# Patient Record
Sex: MALE | Race: WHITE | Employment: OTHER | URBAN - METROPOLITAN AREA
[De-identification: names, ages, dates, MRNs, and addresses within clinical notes are randomized per-mention and may not be internally consistent; named-entity substitution may affect disease eponyms.]

---

## 2018-10-24 ENCOUNTER — HOSPITAL ENCOUNTER (OUTPATIENT)
Dept: INFUSION THERAPY | Age: 73
End: 2018-10-24

## 2018-11-01 ENCOUNTER — HOSPITAL ENCOUNTER (OUTPATIENT)
Dept: INFUSION THERAPY | Age: 73
Discharge: HOME | End: 2018-11-01
Payer: MEDICARE

## 2018-11-01 VITALS
BODY MASS INDEX: 26.92 KG/M2 | SYSTOLIC BLOOD PRESSURE: 150 MMHG | TEMPERATURE: 98.5 F | HEIGHT: 73 IN | OXYGEN SATURATION: 97 % | RESPIRATION RATE: 18 BRPM | WEIGHT: 203.1 LBS | DIASTOLIC BLOOD PRESSURE: 73 MMHG | HEART RATE: 76 BPM

## 2018-11-01 LAB
ALBUMIN SERPL-MCNC: 3.8 G/DL (ref 3.4–5)
ALBUMIN/GLOB SERPL: 1.1 {RATIO} (ref 0.8–1.7)
ALP SERPL-CCNC: 184 U/L (ref 45–117)
ALT SERPL-CCNC: 65 U/L (ref 16–61)
ANION GAP SERPL CALC-SCNC: 13 MMOL/L (ref 3–18)
AST SERPL-CCNC: 63 U/L (ref 15–37)
BASO+EOS+MONOS # BLD AUTO: 0.7 K/UL (ref 0–2.3)
BASO+EOS+MONOS # BLD AUTO: 13 % (ref 0.1–17)
BILIRUB SERPL-MCNC: 0.5 MG/DL (ref 0.2–1)
BUN SERPL-MCNC: 47 MG/DL (ref 7–18)
BUN/CREAT SERPL: 14
CALCIUM SERPL-MCNC: 9 MG/DL (ref 8.5–10.1)
CHLORIDE SERPL-SCNC: 96 MMOL/L (ref 100–108)
CO2 SERPL-SCNC: 22 MMOL/L (ref 21–32)
CREAT SERPL-MCNC: 3.27 MG/DL (ref 0.6–1.3)
DIFFERENTIAL METHOD BLD: ABNORMAL
ERYTHROCYTE [DISTWIDTH] IN BLOOD BY AUTOMATED COUNT: 12.7 % (ref 11.5–14.5)
GLOBULIN SER CALC-MCNC: 3.4 G/DL (ref 2–4)
GLUCOSE SERPL-MCNC: 431 MG/DL (ref 74–99)
HCT VFR BLD AUTO: 26.6 % (ref 36–48)
HGB BLD-MCNC: 9.2 G/DL (ref 12–16)
LDH SERPL L TO P-CCNC: 234 U/L (ref 81–234)
LYMPHOCYTES # BLD: 1.8 K/UL (ref 1.1–5.9)
LYMPHOCYTES NFR BLD: 31 % (ref 14–44)
MCH RBC QN AUTO: 33.3 PG (ref 25–35)
MCHC RBC AUTO-ENTMCNC: 34.6 G/DL (ref 31–37)
MCV RBC AUTO: 96.4 FL (ref 78–102)
NEUTS SEG # BLD: 3.2 K/UL (ref 1.8–9.5)
NEUTS SEG NFR BLD: 56 % (ref 40–70)
PLATELET # BLD AUTO: 165 K/UL (ref 140–440)
POTASSIUM SERPL-SCNC: 4.1 MMOL/L (ref 3.5–5.5)
PROT SERPL-MCNC: 7.2 G/DL (ref 6.4–8.2)
RBC # BLD AUTO: 2.76 M/UL (ref 4.1–5.1)
SODIUM SERPL-SCNC: 131 MMOL/L (ref 136–145)
WBC # BLD AUTO: 5.7 K/UL (ref 4.5–13)

## 2018-11-01 PROCEDURE — 83615 LACTATE (LD) (LDH) ENZYME: CPT | Performed by: INTERNAL MEDICINE

## 2018-11-01 PROCEDURE — 80053 COMPREHEN METABOLIC PANEL: CPT | Performed by: INTERNAL MEDICINE

## 2018-11-01 PROCEDURE — 74011000258 HC RX REV CODE- 258: Performed by: INTERNAL MEDICINE

## 2018-11-01 PROCEDURE — 85025 COMPLETE CBC W/AUTO DIFF WBC: CPT | Performed by: INTERNAL MEDICINE

## 2018-11-01 PROCEDURE — 96375 TX/PRO/DX INJ NEW DRUG ADDON: CPT

## 2018-11-01 PROCEDURE — 96413 CHEMO IV INFUSION 1 HR: CPT

## 2018-11-01 PROCEDURE — 74011250637 HC RX REV CODE- 250/637: Performed by: INTERNAL MEDICINE

## 2018-11-01 PROCEDURE — 96415 CHEMO IV INFUSION ADDL HR: CPT

## 2018-11-01 PROCEDURE — 36415 COLL VENOUS BLD VENIPUNCTURE: CPT | Performed by: INTERNAL MEDICINE

## 2018-11-01 PROCEDURE — 99203 OFFICE O/P NEW LOW 30 MIN: CPT

## 2018-11-01 PROCEDURE — 74011250636 HC RX REV CODE- 250/636: Performed by: INTERNAL MEDICINE

## 2018-11-01 PROCEDURE — 36415 COLL VENOUS BLD VENIPUNCTURE: CPT

## 2018-11-01 RX ORDER — INSULIN GLARGINE 100 [IU]/ML
35 INJECTION, SOLUTION SUBCUTANEOUS
Status: ON HOLD | COMMUNITY
End: 2018-12-06 | Stop reason: SDUPTHER

## 2018-11-01 RX ORDER — GLUCOSAMINE/CHONDR SU A SOD 750-600 MG
TABLET ORAL
COMMUNITY

## 2018-11-01 RX ORDER — TRIAMCINOLONE ACETONIDE 1 MG/G
OINTMENT TOPICAL 2 TIMES DAILY
COMMUNITY

## 2018-11-01 RX ORDER — IRON 18 MG
TABLET ORAL
COMMUNITY

## 2018-11-01 RX ORDER — DIPHENHYDRAMINE HYDROCHLORIDE 50 MG/ML
50 INJECTION, SOLUTION INTRAMUSCULAR; INTRAVENOUS ONCE
Status: COMPLETED | OUTPATIENT
Start: 2018-11-01 | End: 2018-11-01

## 2018-11-01 RX ORDER — DIMETHICONE 13 MG/ML
425 LOTION TOPICAL DAILY
COMMUNITY

## 2018-11-01 RX ORDER — GLUCOSAMINE SULFATE 1500 MG
1000 POWDER IN PACKET (EA) ORAL 2 TIMES DAILY
COMMUNITY

## 2018-11-01 RX ORDER — ACETAMINOPHEN 325 MG/1
650 TABLET ORAL ONCE
Status: COMPLETED | OUTPATIENT
Start: 2018-11-01 | End: 2018-11-01

## 2018-11-01 RX ORDER — INSULIN LISPRO 100 [IU]/ML
INJECTION, SOLUTION INTRAVENOUS; SUBCUTANEOUS AS NEEDED
COMMUNITY

## 2018-11-01 RX ORDER — ACETAMINOPHEN 325 MG/1
650 TABLET ORAL
Status: DISCONTINUED | OUTPATIENT
Start: 2018-11-01 | End: 2018-11-05 | Stop reason: HOSPADM

## 2018-11-01 RX ORDER — ZINC GLUCONATE 50 MG
TABLET ORAL
COMMUNITY

## 2018-11-01 RX ORDER — DIPHENHYDRAMINE HYDROCHLORIDE 50 MG/ML
50 INJECTION, SOLUTION INTRAMUSCULAR; INTRAVENOUS
Status: DISCONTINUED | OUTPATIENT
Start: 2018-11-01 | End: 2018-11-05 | Stop reason: HOSPADM

## 2018-11-01 RX ORDER — SODIUM CHLORIDE 0.9 % (FLUSH) 0.9 %
10-40 SYRINGE (ML) INJECTION AS NEEDED
Status: DISCONTINUED | OUTPATIENT
Start: 2018-11-01 | End: 2018-11-05 | Stop reason: HOSPADM

## 2018-11-01 RX ORDER — FUROSEMIDE 80 MG/1
80 TABLET ORAL 2 TIMES DAILY
COMMUNITY

## 2018-11-01 RX ORDER — SIMVASTATIN 20 MG/1
TABLET, FILM COATED ORAL
COMMUNITY

## 2018-11-01 RX ORDER — SODIUM CHLORIDE 9 MG/ML
50 INJECTION, SOLUTION INTRAVENOUS CONTINUOUS
Status: DISPENSED | OUTPATIENT
Start: 2018-11-01 | End: 2018-11-02

## 2018-11-01 RX ORDER — POTASSIUM CHLORIDE 20 MEQ/1
20 TABLET, EXTENDED RELEASE ORAL 2 TIMES DAILY
COMMUNITY

## 2018-11-01 RX ORDER — CARVEDILOL 12.5 MG/1
12.5 TABLET ORAL DAILY
COMMUNITY

## 2018-11-01 RX ADMIN — RITUXIMAB 800 MG: 10 INJECTION, SOLUTION INTRAVENOUS at 12:02

## 2018-11-01 RX ADMIN — METHYLPREDNISOLONE SODIUM SUCCINATE 125 MG: 125 INJECTION, POWDER, FOR SOLUTION INTRAMUSCULAR; INTRAVENOUS at 11:20

## 2018-11-01 RX ADMIN — Medication 10 ML: at 15:50

## 2018-11-01 RX ADMIN — ACETAMINOPHEN 650 MG: 325 TABLET ORAL at 11:18

## 2018-11-01 RX ADMIN — SODIUM CHLORIDE 50 ML/HR: 900 INJECTION, SOLUTION INTRAVENOUS at 11:10

## 2018-11-01 RX ADMIN — Medication 10 ML: at 11:10

## 2018-11-01 RX ADMIN — DIPHENHYDRAMINE HYDROCHLORIDE 50 MG: 50 INJECTION INTRAMUSCULAR; INTRAVENOUS at 11:30

## 2018-11-01 NOTE — PROGRESS NOTES
[facility] Progress Note Date: 2018 Name: Marisol Higgins MRN: [id number] : 1945 Chemotherapy Cycle: C1 of 2 Rituximab Mr. Dc Burgess was assessed and education was provided. Dr. Santo Bhandari was here to obtained signed consent from patient for Ritux infusion. Consent signed. Care note reviewed with patient and his friend, Ana Barahona. Understanding was verbalized by both of them. Mr. Gómez Louis vitals were reviewed. Visit Vitals /73 (BP 1 Location: Right arm, BP Patient Position: At rest) Pulse 76 Temp 98.5 °F (36.9 °C) Resp 18 Ht 6' 1\" (1.854 m) Wt 92.1 kg (203 lb 1.6 oz) SpO2 97% BMI 26.80 kg/m² Patient Vitals for the past 12 hrs: 
 Temp Pulse Resp BP SpO2  
18 1546 98.5 °F (36.9 °C) 76 18 150/73   
18 1435  70 18 147/66   
18 1405  69 18 135/71   
18 1335  67 18 146/73   
18 1305  66 18 140/69   
18 1235  88 18 119/56   
18 1045 97.6 °F (36.4 °C) 66 18 142/66 97 % IV started in left forearm w/22 gauge INT w/o difficulty. Good blood return obtained. Labs drawn, followed with 10 ml NS flush. Lab results were obtained and reviewed. Recent Results (from the past 12 hour(s)) CBC WITH 3 PART DIFF Collection Time: 18 11:10 AM  
Result Value Ref Range WBC 5.7 4.5 - 13.0 K/uL  
 RBC 2.76 (L) 4.10 - 5.10 M/uL HGB 9.2 (L) 12.0 - 16.0 g/dL HCT 26.6 (L) 36 - 48 % MCV 96.4 78 - 102 FL  
 MCH 33.3 25.0 - 35.0 PG  
 MCHC 34.6 31 - 37 g/dL  
 RDW 12.7 11.5 - 14.5 % PLATELET 244 383 - 970 K/uL NEUTROPHILS 56 40 - 70 % MIXED CELLS 13 0.1 - 17 % LYMPHOCYTES 31 14 - 44 % ABS. NEUTROPHILS 3.2 1.8 - 9.5 K/UL  
 ABS. MIXED CELLS 0.7 0.0 - 2.3 K/uL  
 ABS. LYMPHOCYTES 1.8 1.1 - 5.9 K/UL  
 DF AUTOMATED METABOLIC PANEL, COMPREHENSIVE Collection Time: 18 11:10 AM  
Result Value Ref Range Sodium 131 (L) 136 - 145 mmol/L  Potassium 4.1 3.5 - 5.5 mmol/L  
 Chloride 96 (L) 100 - 108 mmol/L  
 CO2 22 21 - 32 mmol/L Anion gap 13 3.0 - 18 mmol/L Glucose 431 (HH) 74 - 99 mg/dL BUN 47 (H) 7.0 - 18 MG/DL Creatinine 3.27 (H) 0.6 - 1.3 MG/DL  
 BUN/Creatinine ratio 14 GFR est AA 23 (L) >60 ml/min/1.73m2 GFR est non-AA 19 (L) >60 ml/min/1.73m2 Calcium 9.0 8.5 - 10.1 MG/DL Bilirubin, total 0.5 0.2 - 1.0 MG/DL  
 ALT (SGPT) 65 (H) 16 - 61 U/L  
 AST (SGOT) 63 (H) 15 - 37 U/L Alk. phosphatase 184 (H) 45 - 117 U/L Protein, total 7.2 6.4 - 8.2 g/dL Albumin 3.8 3.4 - 5.0 g/dL Globulin 3.4 2.0 - 4.0 g/dL A-G Ratio 1.1 0.8 - 1.7 LD Collection Time: 11/01/18 11:10 AM  
Result Value Ref Range  81 - 234 U/L Pre-medications of oral Tylenol 650 mg, IVP Benadryl 50 mg/10 ml, IVP Solu Medrol 125 mg/2 ml were administered as ordered and chemotherapy was initiated. Rituxan 800 mg/200ml was infused at 25 ml/hr x 30 minutes, 50 ml/hr x 30 minutes, 75 ml/hr x 30 minutes, 100 ml/hr x 30 minutes, 125 ml/hr x 30 minutes, 150 ml/hr until infusion completed. Rituxan was 4 mg /ml and infusion was started at 50 mg/hr and increased by 50 mg/hr at each 30 minute interval.  Patient was observed for 60 minutes after infusion completed, no s/s reaction were noted. IV flushed with 10 ml NS and removed. No irritation or drainage noted at site, gauze and Coban applied. Mr. Jose Ramachandran tolerated infusion, and had no complaints at this time. Armband removed and shredded. Mr. Jose Ramachandran was discharged from [place] [address] in stable condition at 1600. He is to return on 11/15/2018 at 1000 for his next appointment for second Rituxan infusion. Nava Pimentel RN November 1, 2018 
4:44 PM

## 2018-11-08 RX ORDER — ACETAMINOPHEN 325 MG/1
650 TABLET ORAL ONCE
Status: CANCELLED | OUTPATIENT
Start: 2018-11-15 | End: 2018-11-15

## 2018-11-08 RX ORDER — DIPHENHYDRAMINE HYDROCHLORIDE 50 MG/ML
50 INJECTION, SOLUTION INTRAMUSCULAR; INTRAVENOUS
Status: CANCELLED | OUTPATIENT
Start: 2018-11-15

## 2018-11-08 RX ORDER — DIPHENHYDRAMINE HYDROCHLORIDE 50 MG/ML
50 INJECTION, SOLUTION INTRAMUSCULAR; INTRAVENOUS ONCE
Status: CANCELLED | OUTPATIENT
Start: 2018-11-15 | End: 2018-11-15

## 2018-11-08 RX ORDER — ACETAMINOPHEN 325 MG/1
650 TABLET ORAL
Status: CANCELLED | OUTPATIENT
Start: 2018-11-15

## 2018-11-15 ENCOUNTER — HOSPITAL ENCOUNTER (OUTPATIENT)
Dept: INFUSION THERAPY | Age: 73
Discharge: HOME | End: 2018-11-15
Payer: MEDICARE

## 2018-11-15 VITALS
TEMPERATURE: 97.9 F | BODY MASS INDEX: 26.53 KG/M2 | DIASTOLIC BLOOD PRESSURE: 71 MMHG | RESPIRATION RATE: 18 BRPM | HEIGHT: 73 IN | SYSTOLIC BLOOD PRESSURE: 144 MMHG | HEART RATE: 77 BPM | WEIGHT: 200.2 LBS

## 2018-11-15 LAB
ALBUMIN SERPL-MCNC: 3.7 G/DL (ref 3.4–5)
ALBUMIN/GLOB SERPL: 1.1 {RATIO} (ref 0.8–1.7)
ALP SERPL-CCNC: 171 U/L (ref 45–117)
ALT SERPL-CCNC: 55 U/L (ref 16–61)
ANION GAP SERPL CALC-SCNC: 13 MMOL/L (ref 3–18)
AST SERPL-CCNC: 45 U/L (ref 15–37)
BASO+EOS+MONOS # BLD AUTO: 0.4 K/UL (ref 0–2.3)
BASO+EOS+MONOS # BLD AUTO: 6 % (ref 0.1–17)
BILIRUB SERPL-MCNC: 0.5 MG/DL (ref 0.2–1)
BUN SERPL-MCNC: 41 MG/DL (ref 7–18)
BUN/CREAT SERPL: 13
CALCIUM SERPL-MCNC: 9 MG/DL (ref 8.5–10.1)
CHLORIDE SERPL-SCNC: 94 MMOL/L (ref 100–108)
CO2 SERPL-SCNC: 26 MMOL/L (ref 21–32)
CREAT SERPL-MCNC: 3.16 MG/DL (ref 0.6–1.3)
DIFFERENTIAL METHOD BLD: ABNORMAL
ERYTHROCYTE [DISTWIDTH] IN BLOOD BY AUTOMATED COUNT: 12.5 % (ref 11.5–14.5)
GLOBULIN SER CALC-MCNC: 3.4 G/DL (ref 2–4)
GLUCOSE SERPL-MCNC: 420 MG/DL (ref 74–99)
HCT VFR BLD AUTO: 30.5 % (ref 36–48)
HGB BLD-MCNC: 10.7 G/DL (ref 12–16)
LDH SERPL L TO P-CCNC: 214 U/L (ref 81–234)
LYMPHOCYTES # BLD: 1.6 K/UL (ref 1.1–5.9)
LYMPHOCYTES NFR BLD: 25 % (ref 14–44)
MCH RBC QN AUTO: 33.4 PG (ref 25–35)
MCHC RBC AUTO-ENTMCNC: 35.1 G/DL (ref 31–37)
MCV RBC AUTO: 95.3 FL (ref 78–102)
NEUTS SEG # BLD: 4.3 K/UL (ref 1.8–9.5)
NEUTS SEG NFR BLD: 69 % (ref 40–70)
PLATELET # BLD AUTO: 173 K/UL (ref 140–440)
POTASSIUM SERPL-SCNC: 3.7 MMOL/L (ref 3.5–5.5)
PROT SERPL-MCNC: 7.1 G/DL (ref 6.4–8.2)
RBC # BLD AUTO: 3.2 M/UL (ref 4.1–5.1)
SODIUM SERPL-SCNC: 133 MMOL/L (ref 136–145)
WBC # BLD AUTO: 6.3 K/UL (ref 4.5–13)

## 2018-11-15 PROCEDURE — 36415 COLL VENOUS BLD VENIPUNCTURE: CPT

## 2018-11-15 PROCEDURE — 85025 COMPLETE CBC W/AUTO DIFF WBC: CPT

## 2018-11-15 PROCEDURE — 80053 COMPREHEN METABOLIC PANEL: CPT

## 2018-11-15 PROCEDURE — 83615 LACTATE (LD) (LDH) ENZYME: CPT

## 2018-11-15 RX ORDER — SODIUM CHLORIDE 0.9 % (FLUSH) 0.9 %
5-10 SYRINGE (ML) INJECTION AS NEEDED
Status: DISCONTINUED | OUTPATIENT
Start: 2018-11-15 | End: 2018-11-19 | Stop reason: HOSPADM

## 2018-11-15 NOTE — PROGRESS NOTES
[facility] Progress Note Date: November 15, 2018 Name: Grzegorz Mendosa MRN: [id number] : 1945 Chemotherapy Cycle: C2 of 2 Rituxan  (HELD Today) Mr. Guillermo Jeronimo was assessed and education was provided. Mr. Guillermo Jeronimo arrived ambulatory with family friend at his side. Blood was drawn after initiating #22 gauge needle in patient's LFA x1 attempt. Pt tolerated well. Mr. Guillermo Jeronimo stated that after last infusion his blood glucose was elevated above 600 and he went to the emergency room and was treated for hyperglycemia. Mr. Guillermo Jeronimo stated he did not notify Dr. Ramez Maldonado but stated he did notify his physician, Dr. Deloris Sicard who manages his diabetes. Mr. Guillermo Jeronimo stated his insulin sliding scale was adjusted at that time. Mr. Guillermo Jeronimo stated he did not want to receive steroid today as premedication due to concerns of repeat episode of hyperglycemia. Critical glucose level today of 420. Dr. Ramez Maldonado called and notified of patient's current blood glucose level and also notified of patient's concerns about not wanting to receive Steroid today. Per Dr. Claudene Mallet hold patient's treatment today due to elevated blood sugar and patient's concerns. Pt to follow up as scheduled with Dr. Ramez Maldonado as scheduled to discuss plan for managing blood glucose levels prior to next treatment. Mr. Guillermo Jeronimo stated he would call Dr. Deloris Sicard as well about current glucose level and about plan to manage glucose levels which he stated have been higher than usual since initial dose of steroid. Mr. Cintia Hernandez vitals were reviewed. Visit Vitals /71 (BP 1 Location: Right arm, BP Patient Position: Sitting) Pulse 77 Temp 97.9 °F (36.6 °C) Resp 18 Ht 6' 1\" (1.854 m) Wt 90.8 kg (200 lb 3.2 oz) BMI 26.41 kg/m² Lab results were obtained and reviewed. Recent Results (from the past 12 hour(s)) CBC WITH 3 PART DIFF Collection Time: 11/15/18 10:30 AM  
Result Value Ref Range WBC 6.3 4.5 - 13.0 K/uL RBC 3.20 (L) 4.10 - 5.10 M/uL  
 HGB 10.7 (L) 12.0 - 16.0 g/dL HCT 30.5 (L) 36 - 48 % MCV 95.3 78 - 102 FL  
 MCH 33.4 25.0 - 35.0 PG  
 MCHC 35.1 31 - 37 g/dL  
 RDW 12.5 11.5 - 14.5 % PLATELET 545 949 - 329 K/uL NEUTROPHILS 69 40 - 70 % MIXED CELLS 6 0.1 - 17 % LYMPHOCYTES 25 14 - 44 % ABS. NEUTROPHILS 4.3 1.8 - 9.5 K/UL  
 ABS. MIXED CELLS 0.4 0.0 - 2.3 K/uL  
 ABS. LYMPHOCYTES 1.6 1.1 - 5.9 K/UL  
 DF AUTOMATED    
LD Collection Time: 11/15/18 10:30 AM  
Result Value Ref Range  81 - 439 U/L  
METABOLIC PANEL, COMPREHENSIVE Collection Time: 11/15/18 10:30 AM  
Result Value Ref Range Sodium 133 (L) 136 - 145 mmol/L Potassium 3.7 3.5 - 5.5 mmol/L Chloride 94 (L) 100 - 108 mmol/L  
 CO2 26 21 - 32 mmol/L Anion gap 13 3.0 - 18 mmol/L Glucose 420 (HH) 74 - 99 mg/dL BUN 41 (H) 7.0 - 18 MG/DL Creatinine 3.16 (H) 0.6 - 1.3 MG/DL  
 BUN/Creatinine ratio 13 GFR est AA 23 (L) >60 ml/min/1.73m2 GFR est non-AA 19 (L) >60 ml/min/1.73m2 Calcium 9.0 8.5 - 10.1 MG/DL Bilirubin, total 0.5 0.2 - 1.0 MG/DL  
 ALT (SGPT) 55 16 - 61 U/L  
 AST (SGOT) 45 (H) 15 - 37 U/L Alk. phosphatase 171 (H) 45 - 117 U/L Protein, total 7.1 6.4 - 8.2 g/dL Albumin 3.7 3.4 - 5.0 g/dL Globulin 3.4 2.0 - 4.0 g/dL A-G Ratio 1.1 0.8 - 1.7 PIV d'c'd intact. Site secured with gauze and coban. Mr. Sandhya Hall was discharged from [place] [address] in stable condition at 1230. He is to follow up with Dr. Romayne Ingle as scheduled (11/26/18 per patient) and date of next treatment to be determined at that time. Armband removed and shredded. Chaya Davila RN November 15, 2018 1:35 PM

## 2018-12-05 ENCOUNTER — HOSPITAL ENCOUNTER (OUTPATIENT)
Age: 73
Setting detail: OBSERVATION
LOS: 1 days | Discharge: HOME | End: 2018-12-06
Attending: HOSPITALIST | Admitting: HOSPITALIST
Payer: MEDICARE

## 2018-12-05 ENCOUNTER — HOSPITAL ENCOUNTER (OUTPATIENT)
Dept: INFUSION THERAPY | Age: 73
Discharge: HOME | End: 2018-12-05
Payer: MEDICARE

## 2018-12-05 VITALS
HEART RATE: 90 BPM | HEIGHT: 73 IN | DIASTOLIC BLOOD PRESSURE: 67 MMHG | RESPIRATION RATE: 18 BRPM | BODY MASS INDEX: 26.59 KG/M2 | OXYGEN SATURATION: 99 % | TEMPERATURE: 98.8 F | WEIGHT: 200.6 LBS | SYSTOLIC BLOOD PRESSURE: 130 MMHG

## 2018-12-05 PROBLEM — E11.9 DIABETES MELLITUS, TYPE II, INSULIN DEPENDENT (HCC): Status: ACTIVE | Noted: 2018-12-05

## 2018-12-05 PROBLEM — E78.5 HYPERLIPIDEMIA: Status: ACTIVE | Noted: 2018-12-05

## 2018-12-05 PROBLEM — N01.9 PAUCI-IMMUNE RPGN (RAPIDLY PROGRESSIVE GLOMERULONEPHRITIS): Status: ACTIVE | Noted: 2018-12-05

## 2018-12-05 PROBLEM — R73.9 HYPERGLYCEMIA: Status: ACTIVE | Noted: 2018-12-05

## 2018-12-05 PROBLEM — I10 ESSENTIAL HYPERTENSION: Status: ACTIVE | Noted: 2018-12-05

## 2018-12-05 PROBLEM — Z79.4 DIABETES MELLITUS, TYPE II, INSULIN DEPENDENT (HCC): Status: ACTIVE | Noted: 2018-12-05

## 2018-12-05 LAB
ALBUMIN SERPL-MCNC: 3 G/DL (ref 3.4–5)
ALBUMIN SERPL-MCNC: 3 G/DL (ref 3.4–5)
ALBUMIN/GLOB SERPL: 0.9 {RATIO} (ref 0.8–1.7)
ALBUMIN/GLOB SERPL: 0.9 {RATIO} (ref 0.8–1.7)
ALP SERPL-CCNC: 222 U/L (ref 45–117)
ALP SERPL-CCNC: 251 U/L (ref 45–117)
ALT SERPL-CCNC: 27 U/L (ref 16–61)
ALT SERPL-CCNC: 35 U/L (ref 16–61)
ANION GAP SERPL CALC-SCNC: 7 MMOL/L (ref 3–18)
ANION GAP SERPL CALC-SCNC: 9 MMOL/L (ref 3–18)
AST SERPL-CCNC: 23 U/L (ref 15–37)
AST SERPL-CCNC: 37 U/L (ref 15–37)
BASO+EOS+MONOS # BLD AUTO: 1.3 K/UL (ref 0–2.3)
BASO+EOS+MONOS # BLD AUTO: 10 % (ref 0.1–17)
BILIRUB SERPL-MCNC: 0.4 MG/DL (ref 0.2–1)
BILIRUB SERPL-MCNC: 0.5 MG/DL (ref 0.2–1)
BUN SERPL-MCNC: 54 MG/DL (ref 7–18)
BUN SERPL-MCNC: 57 MG/DL (ref 7–18)
BUN/CREAT SERPL: 17
BUN/CREAT SERPL: 17
CALCIUM SERPL-MCNC: 8.7 MG/DL (ref 8.5–10.1)
CALCIUM SERPL-MCNC: 8.8 MG/DL (ref 8.5–10.1)
CHLORIDE SERPL-SCNC: 100 MMOL/L (ref 100–108)
CHLORIDE SERPL-SCNC: 102 MMOL/L (ref 100–108)
CO2 SERPL-SCNC: 25 MMOL/L (ref 21–32)
CO2 SERPL-SCNC: 30 MMOL/L (ref 21–32)
CREAT SERPL-MCNC: 3.2 MG/DL (ref 0.6–1.3)
CREAT SERPL-MCNC: 3.35 MG/DL (ref 0.6–1.3)
DIFFERENTIAL METHOD BLD: ABNORMAL
ERYTHROCYTE [DISTWIDTH] IN BLOOD BY AUTOMATED COUNT: 12.1 % (ref 11.5–14.5)
EST. AVERAGE GLUCOSE BLD GHB EST-MCNC: 258 MG/DL
GLOBULIN SER CALC-MCNC: 3.5 G/DL (ref 2–4)
GLOBULIN SER CALC-MCNC: 3.5 G/DL (ref 2–4)
GLUCOSE BLD STRIP.AUTO-MCNC: 305 MG/DL (ref 70–110)
GLUCOSE BLD STRIP.AUTO-MCNC: 374 MG/DL (ref 70–110)
GLUCOSE SERPL-MCNC: 228 MG/DL (ref 74–99)
GLUCOSE SERPL-MCNC: 365 MG/DL (ref 74–99)
HBA1C MFR BLD: 10.6 % (ref 4.2–5.6)
HCT VFR BLD AUTO: 27.2 % (ref 36–48)
HGB BLD-MCNC: 9.4 G/DL (ref 12–16)
LDH SERPL L TO P-CCNC: 156 U/L (ref 81–234)
LYMPHOCYTES # BLD: 1.4 K/UL (ref 1.1–5.9)
LYMPHOCYTES NFR BLD: 11 % (ref 14–44)
MCH RBC QN AUTO: 33 PG (ref 25–35)
MCHC RBC AUTO-ENTMCNC: 34.6 G/DL (ref 31–37)
MCV RBC AUTO: 95.4 FL (ref 78–102)
NEUTS SEG # BLD: 10 K/UL (ref 1.8–9.5)
NEUTS SEG NFR BLD: 79 % (ref 40–70)
PLATELET # BLD AUTO: 396 K/UL (ref 140–440)
POTASSIUM SERPL-SCNC: 4 MMOL/L (ref 3.5–5.5)
POTASSIUM SERPL-SCNC: 4.8 MMOL/L (ref 3.5–5.5)
PROT SERPL-MCNC: 6.5 G/DL (ref 6.4–8.2)
PROT SERPL-MCNC: 6.5 G/DL (ref 6.4–8.2)
RBC # BLD AUTO: 2.85 M/UL (ref 4.1–5.1)
SODIUM SERPL-SCNC: 136 MMOL/L (ref 136–145)
SODIUM SERPL-SCNC: 137 MMOL/L (ref 136–145)
WBC # BLD AUTO: 12.7 K/UL (ref 4.5–13)

## 2018-12-05 PROCEDURE — 74011250637 HC RX REV CODE- 250/637: Performed by: INTERNAL MEDICINE

## 2018-12-05 PROCEDURE — 74011250637 HC RX REV CODE- 250/637: Performed by: PHYSICIAN ASSISTANT

## 2018-12-05 PROCEDURE — 36415 COLL VENOUS BLD VENIPUNCTURE: CPT

## 2018-12-05 PROCEDURE — 82962 GLUCOSE BLOOD TEST: CPT

## 2018-12-05 PROCEDURE — 96415 CHEMO IV INFUSION ADDL HR: CPT

## 2018-12-05 PROCEDURE — 74011250636 HC RX REV CODE- 250/636: Performed by: PHYSICIAN ASSISTANT

## 2018-12-05 PROCEDURE — 74011636637 HC RX REV CODE- 636/637: Performed by: PHYSICIAN ASSISTANT

## 2018-12-05 PROCEDURE — 74011250636 HC RX REV CODE- 250/636: Performed by: INTERNAL MEDICINE

## 2018-12-05 PROCEDURE — 83615 LACTATE (LD) (LDH) ENZYME: CPT

## 2018-12-05 PROCEDURE — 96413 CHEMO IV INFUSION 1 HR: CPT

## 2018-12-05 PROCEDURE — 80053 COMPREHEN METABOLIC PANEL: CPT

## 2018-12-05 PROCEDURE — 96360 HYDRATION IV INFUSION INIT: CPT

## 2018-12-05 PROCEDURE — 85025 COMPLETE CBC W/AUTO DIFF WBC: CPT

## 2018-12-05 PROCEDURE — 74011636637 HC RX REV CODE- 636/637: Performed by: HOSPITALIST

## 2018-12-05 PROCEDURE — 83036 HEMOGLOBIN GLYCOSYLATED A1C: CPT

## 2018-12-05 PROCEDURE — 74011000258 HC RX REV CODE- 258: Performed by: INTERNAL MEDICINE

## 2018-12-05 PROCEDURE — 99218 HC RM OBSERVATION: CPT

## 2018-12-05 PROCEDURE — 96375 TX/PRO/DX INJ NEW DRUG ADDON: CPT

## 2018-12-05 PROCEDURE — 96372 THER/PROPH/DIAG INJ SC/IM: CPT

## 2018-12-05 PROCEDURE — 96361 HYDRATE IV INFUSION ADD-ON: CPT

## 2018-12-05 RX ORDER — CARVEDILOL 12.5 MG/1
12.5 TABLET ORAL DAILY
Status: DISCONTINUED | OUTPATIENT
Start: 2018-12-06 | End: 2018-12-06 | Stop reason: HOSPADM

## 2018-12-05 RX ORDER — ACETAMINOPHEN 325 MG/1
650 TABLET ORAL
Status: DISCONTINUED | OUTPATIENT
Start: 2018-12-05 | End: 2018-12-06 | Stop reason: HOSPADM

## 2018-12-05 RX ORDER — ACETAMINOPHEN 325 MG/1
650 TABLET ORAL
Status: DISCONTINUED | OUTPATIENT
Start: 2018-12-05 | End: 2018-12-09 | Stop reason: HOSPADM

## 2018-12-05 RX ORDER — SODIUM CHLORIDE 0.9 % (FLUSH) 0.9 %
5-10 SYRINGE (ML) INJECTION EVERY 8 HOURS
Status: DISCONTINUED | OUTPATIENT
Start: 2018-12-05 | End: 2018-12-06 | Stop reason: HOSPADM

## 2018-12-05 RX ORDER — SODIUM CHLORIDE 9 MG/ML
25 INJECTION, SOLUTION INTRAVENOUS ONCE
Status: COMPLETED | OUTPATIENT
Start: 2018-12-05 | End: 2018-12-05

## 2018-12-05 RX ORDER — DIPHENHYDRAMINE HYDROCHLORIDE 50 MG/ML
50 INJECTION, SOLUTION INTRAMUSCULAR; INTRAVENOUS ONCE
Status: COMPLETED | OUTPATIENT
Start: 2018-12-05 | End: 2018-12-05

## 2018-12-05 RX ORDER — MAGNESIUM SULFATE 100 %
4 CRYSTALS MISCELLANEOUS AS NEEDED
Status: DISCONTINUED | OUTPATIENT
Start: 2018-12-05 | End: 2018-12-06 | Stop reason: HOSPADM

## 2018-12-05 RX ORDER — SODIUM CHLORIDE 0.9 % (FLUSH) 0.9 %
10-40 SYRINGE (ML) INJECTION AS NEEDED
Status: DISCONTINUED | OUTPATIENT
Start: 2018-12-05 | End: 2018-12-09 | Stop reason: HOSPADM

## 2018-12-05 RX ORDER — SIMVASTATIN 20 MG/1
20 TABLET, FILM COATED ORAL
Status: DISCONTINUED | OUTPATIENT
Start: 2018-12-05 | End: 2018-12-06 | Stop reason: HOSPADM

## 2018-12-05 RX ORDER — INSULIN GLARGINE 100 [IU]/ML
15 INJECTION, SOLUTION SUBCUTANEOUS
Status: DISCONTINUED | OUTPATIENT
Start: 2018-12-05 | End: 2018-12-06 | Stop reason: HOSPADM

## 2018-12-05 RX ORDER — FUROSEMIDE 40 MG/1
80 TABLET ORAL
Status: DISCONTINUED | OUTPATIENT
Start: 2018-12-05 | End: 2018-12-06 | Stop reason: HOSPADM

## 2018-12-05 RX ORDER — ACETAMINOPHEN 325 MG/1
650 TABLET ORAL ONCE
Status: COMPLETED | OUTPATIENT
Start: 2018-12-05 | End: 2018-12-05

## 2018-12-05 RX ORDER — INSULIN LISPRO 100 [IU]/ML
10 INJECTION, SOLUTION INTRAVENOUS; SUBCUTANEOUS
Status: DISCONTINUED | OUTPATIENT
Start: 2018-12-05 | End: 2018-12-06 | Stop reason: HOSPADM

## 2018-12-05 RX ORDER — HEPARIN SODIUM 5000 [USP'U]/ML
5000 INJECTION, SOLUTION INTRAVENOUS; SUBCUTANEOUS EVERY 8 HOURS
Status: DISCONTINUED | OUTPATIENT
Start: 2018-12-05 | End: 2018-12-06 | Stop reason: HOSPADM

## 2018-12-05 RX ORDER — DIPHENHYDRAMINE HYDROCHLORIDE 50 MG/ML
50 INJECTION, SOLUTION INTRAMUSCULAR; INTRAVENOUS
Status: DISCONTINUED | OUTPATIENT
Start: 2018-12-05 | End: 2018-12-09 | Stop reason: HOSPADM

## 2018-12-05 RX ORDER — DEXTROSE 50 % IN WATER (D50W) INTRAVENOUS SYRINGE
25-50 AS NEEDED
Status: DISCONTINUED | OUTPATIENT
Start: 2018-12-05 | End: 2018-12-06 | Stop reason: HOSPADM

## 2018-12-05 RX ORDER — INSULIN LISPRO 100 [IU]/ML
INJECTION, SOLUTION INTRAVENOUS; SUBCUTANEOUS
Status: DISCONTINUED | OUTPATIENT
Start: 2018-12-05 | End: 2018-12-06 | Stop reason: HOSPADM

## 2018-12-05 RX ORDER — INSULIN GLARGINE 100 [IU]/ML
45 INJECTION, SOLUTION SUBCUTANEOUS DAILY
Status: DISCONTINUED | OUTPATIENT
Start: 2018-12-06 | End: 2018-12-06 | Stop reason: HOSPADM

## 2018-12-05 RX ORDER — SODIUM CHLORIDE 0.9 % (FLUSH) 0.9 %
5-10 SYRINGE (ML) INJECTION AS NEEDED
Status: DISCONTINUED | OUTPATIENT
Start: 2018-12-05 | End: 2018-12-06 | Stop reason: HOSPADM

## 2018-12-05 RX ORDER — SODIUM CHLORIDE 9 MG/ML
125 INJECTION, SOLUTION INTRAVENOUS CONTINUOUS
Status: DISCONTINUED | OUTPATIENT
Start: 2018-12-05 | End: 2018-12-06 | Stop reason: HOSPADM

## 2018-12-05 RX ORDER — INSULIN LISPRO 100 [IU]/ML
10 INJECTION, SOLUTION INTRAVENOUS; SUBCUTANEOUS ONCE
Status: COMPLETED | OUTPATIENT
Start: 2018-12-05 | End: 2018-12-05

## 2018-12-05 RX ADMIN — INSULIN LISPRO 10 UNITS: 100 INJECTION, SOLUTION INTRAVENOUS; SUBCUTANEOUS at 15:56

## 2018-12-05 RX ADMIN — INSULIN GLARGINE 15 UNITS: 100 INJECTION, SOLUTION SUBCUTANEOUS at 21:48

## 2018-12-05 RX ADMIN — SIMVASTATIN 20 MG: 20 TABLET, FILM COATED ORAL at 21:47

## 2018-12-05 RX ADMIN — DIPHENHYDRAMINE HYDROCHLORIDE 50 MG: 50 INJECTION INTRAMUSCULAR; INTRAVENOUS at 10:02

## 2018-12-05 RX ADMIN — INSULIN LISPRO 10 UNITS: 100 INJECTION, SOLUTION INTRAVENOUS; SUBCUTANEOUS at 18:47

## 2018-12-05 RX ADMIN — SODIUM CHLORIDE 125 ML/HR: 900 INJECTION, SOLUTION INTRAVENOUS at 16:30

## 2018-12-05 RX ADMIN — SODIUM CHLORIDE 125 ML/HR: 900 INJECTION, SOLUTION INTRAVENOUS at 23:42

## 2018-12-05 RX ADMIN — METHYLPREDNISOLONE SODIUM SUCCINATE 125 MG: 125 INJECTION, POWDER, FOR SOLUTION INTRAMUSCULAR; INTRAVENOUS at 10:02

## 2018-12-05 RX ADMIN — Medication 10 ML: at 16:38

## 2018-12-05 RX ADMIN — HEPARIN SODIUM 5000 UNITS: 5000 INJECTION INTRAVENOUS; SUBCUTANEOUS at 16:30

## 2018-12-05 RX ADMIN — FUROSEMIDE 80 MG: 40 TABLET ORAL at 16:30

## 2018-12-05 RX ADMIN — HEPARIN SODIUM 5000 UNITS: 5000 INJECTION INTRAVENOUS; SUBCUTANEOUS at 23:41

## 2018-12-05 RX ADMIN — SODIUM CHLORIDE 25 ML/HR: 900 INJECTION, SOLUTION INTRAVENOUS at 10:03

## 2018-12-05 RX ADMIN — INSULIN LISPRO 12 UNITS: 100 INJECTION, SOLUTION INTRAVENOUS; SUBCUTANEOUS at 21:47

## 2018-12-05 RX ADMIN — Medication 30 ML: at 10:02

## 2018-12-05 RX ADMIN — RITUXIMAB 800 MG: 10 INJECTION, SOLUTION INTRAVENOUS at 11:01

## 2018-12-05 RX ADMIN — ACETAMINOPHEN 650 MG: 325 TABLET ORAL at 10:02

## 2018-12-05 NOTE — H&P
History & Physical    Patient: Frank Mcconnell MRN: [id number]  CSN: [id number]    YOB: 1945  Age: 74 y.o. 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 Insulin-Dependent Type 2 DM with Hyperglycemia  2. Hypertension  3. Hyperlipidemia  4. Chronic Kidney Disease Stage 4  5. Pauci-Immune GN    1. Admit for further care. Patient states that he has had issues with receiving pre-medications for Rituximab treatment, which subsequently cause hyperglycemia. He received 125mg of IV Solumedrol this morning at the infusion center. Will obtain STAT CMP, as all lab work has been done this morning at the infusion center. Bedside POC glucose reads 374, will give one time dose of corrective coverage of 10 units. Will place patient on 10 units mealtime scheduled humalog, sliding scale, and continue his usual 45 units of Lantus tomorrow AM. Check BS ACHS. 2. Reviewed patient's medication list, which reports Coreg 12.5mg every day. Order upon admission and monitor BP. 3. Patient takes Zocor at home, ordered for QHS. 4. Managed by Dr Sue Ambrose as an outpatient. His medication list states Lasix 80mg BID, will resume upon admission. 5. Per patient's family member in room, he is receiving infusions for his Pauci-immune GN. Being managed by Dr Sue Ambrose. Estimated length of stay : 1-2 days    CC: Hyperglycemia       HPI:     Frank Mcconnell is a 74 y.o. male who has a history of hypertension, Type 2 IDDM, chronic kidney disease stage 4, and Pauci-immune GN. Surgical history includes \"a metal plate in my neck\", but doesn't remember specifics.  He states that he was receiving a rituximab infusion today at the outpatient infusion center, for which he was premedicated with tylenol, benadryl, and IV steroids, when he was told his blood sugar was elevated. He was then directly admitted to medical services for care. The patient states that he has had issues with premedication before, resulting in hyperglycemia, requiring hospitalization. The patient denies any HA, changes in vision, confusion, dizziness, chest pain, SOB, SAHA, PND, abdominal pain, N/V/D, dysuria, hematuria, melena, hematochezia. Past Medical History:   Diagnosis Date    Arthritis     Autoimmune disease ([facility]     Chronic kidney disease     Diabetes ([facility]     Hypertension     Thyroid disease        Past Surgical History:   Procedure Laterality Date    HX ORTHOPAEDIC         No known family history. Social History     Socioeconomic History    Marital status: SINGLE     Spouse name: Not on file    Number of children: Not on file    Years of education: Not on file    Highest education level: Not on file   Tobacco Use    Smoking status: Former Smoker     Packs/day: 5.00     Years: 54.00     Pack years: 270.00     Last attempt to quit: 2006     Years since quittin.9    Smokeless tobacco: Never Used   Substance and Sexual Activity    Alcohol use: No     Frequency: Never    Drug use: No    Sexual activity: No   Other Topics Concern       Prior to Admission medications    Medication Sig Start Date End Date Taking? Authorizing Provider   carvedilol (COREG) 12.5 mg tablet Take 12.5 mg by mouth daily. Provider, Historical   cranberry extract 425 mg cap Take 425 mg by mouth daily. Provider, Historical   furosemide (LASIX) 80 mg tablet Take 80 mg by mouth two (2) times a day. Provider, Historical   insulin glargine (LANTUS) 100 unit/mL injection 35 Units by SubCUTAneous route Daily (before breakfast). Provider, Historical   insulin lispro (HUMALOG) 100 unit/mL kwikpen by SubCUTAneous route as needed.     Provider, Historical   iron 18 mg tab Take  by mouth. Provider, Historical   Magnesium Oxide 500 mg cap Take  by mouth. Provider, Historical   multivit-min/folic/vit K/lycop (ONE-A-DAY MEN'S 50 PLUS PO) Take  by mouth. Provider, Historical   potassium chloride (KLOR-CON M20) 20 mEq tablet Take 20 mEq by mouth two (2) times a day. Provider, Historical   B.infantis-B.ani-B.long-B.bifi (PROBIOTIC 4X) 10-15 mg TbEC Take  by mouth. Provider, Historical   simvastatin (ZOCOR) 20 mg tablet Take  by mouth nightly. Provider, Historical   triamcinolone acetonide (KENALOG) 0.1 % ointment Apply  to affected area two (2) times a day. use thin layer    Provider, Historical   cholecalciferol (VITAMIN D3) 1,000 unit cap Take 1,000 Units by mouth two (2) times a day. Provider, Historical   Biotin 2,500 mcg cap Take  by mouth. Provider, Historical       No Known Allergies    Review of Systems  Gen: No fever, chills, malaise, weight loss/gain. Heent: No headache, rhinorrhea, epistaxis, ear pain, hearing loss, sinus pain, neck pain/stiffness, sore throat. Heart: No chest pain, palpitations, SAHA, pnd, or orthopnea. Resp: No cough, hemoptysis, wheezing and shortness of breath. GI: No nausea, vomiting, diarrhea, constipation, melena or hematochezia. : No urinary obstruction, dysuria or hematuria. Derm: No rash, new skin lesion or pruritis. Musc/skeletal: no bone or joint complains. Vasc: No edema, cyanosis or claudication. Endo: No heat/cold intolerance, no polyuria,polydipsia or polyphagia. Neuro: No unilateral weakness, numbness, tingling. No seizures. Heme: No easy bruising or bleeding. Physical Exam:     Physical Exam:  Visit Vitals  /79 (BP 1 Location: Left arm, BP Patient Position: At rest)   Pulse 82   Temp 97.7 °F (36.5 °C)   Resp 18   SpO2 95%           Temp (24hrs), Av.4 °F (36.9 °C), Min:97.7 °F (36.5 °C), Max:98.8 °F (37.1 °C)    No intake/output data recorded.    No intake/output data recorded. General:  Elderly white gentleman. Awake, cooperative, no distress. Head:  Normocephalic, without obvious abnormality, atraumatic. Eyes:  Conjunctivae/corneas clear, sclera anicteric, PERRL, EOMs intact. Nose: Nares normal. No drainage or sinus tenderness. Throat: Lips, mucosa, and tongue normal.    Neck: Supple, symmetrical, trachea midline, no adenopathy. Lungs:   Clear to auscultation bilaterally. Heart:  Regular rate and rhythm, S1, S2 normal, no murmur, click, rub or gallop. Abdomen: Soft, non-tender. Bowel sounds normal. No masses,  No organomegaly. Extremities: Extremities normal, atraumatic, no cyanosis or edema. Capillary refill normal.   Pulses: 2+ and symmetric all extremities. Skin: Skin color pink/pale/mottled/flushed, turgor normal. No rashes or lesions   Neurologic: CNII-XII intact. No focal motor or sensory deficit. Labs Reviewed: All lab results for the last 24 hours reviewed. Recent Results (from the past 24 hour(s))   CBC WITH 3 PART DIFF    Collection Time: 12/05/18  8:52 AM   Result Value Ref Range    WBC 12.7 4.5 - 13.0 K/uL    RBC 2.85 (L) 4.10 - 5.10 M/uL    HGB 9.4 (L) 12.0 - 16.0 g/dL    HCT 27.2 (L) 36 - 48 %    MCV 95.4 78 - 102 FL    MCH 33.0 25.0 - 35.0 PG    MCHC 34.6 31 - 37 g/dL    RDW 12.1 11.5 - 14.5 %    PLATELET 678 443 - 164 K/uL    NEUTROPHILS 79 (H) 40 - 70 %    MIXED CELLS 10 0.1 - 17 %    LYMPHOCYTES 11 (L) 14 - 44 %    ABS. NEUTROPHILS 10.0 (H) 1.8 - 9.5 K/UL    ABS. MIXED CELLS 1.3 0.0 - 2.3 K/uL    ABS.  LYMPHOCYTES 1.4 1.1 - 5.9 K/UL    DF AUTOMATED     METABOLIC PANEL, COMPREHENSIVE    Collection Time: 12/05/18  8:52 AM   Result Value Ref Range    Sodium 137 136 - 145 mmol/L    Potassium 4.0 3.5 - 5.5 mmol/L    Chloride 100 100 - 108 mmol/L    CO2 30 21 - 32 mmol/L    Anion gap 7 3.0 - 18 mmol/L    Glucose 228 (H) 74 - 99 mg/dL    BUN 54 (H) 7.0 - 18 MG/DL    Creatinine 3.20 (H) 0.6 - 1.3 MG/DL    BUN/Creatinine ratio 17 GFR est AA 23 (L) >60 ml/min/1.73m2    GFR est non-AA 19 (L) >60 ml/min/1.73m2    Calcium 8.8 8.5 - 10.1 MG/DL    Bilirubin, total 0.5 0.2 - 1.0 MG/DL    ALT (SGPT) 27 16 - 61 U/L    AST (SGOT) 23 15 - 37 U/L    Alk.  phosphatase 222 (H) 45 - 117 U/L    Protein, total 6.5 6.4 - 8.2 g/dL    Albumin 3.0 (L) 3.4 - 5.0 g/dL    Globulin 3.5 2.0 - 4.0 g/dL    A-G Ratio 0.9 0.8 - 1.7     LD    Collection Time: 12/05/18  8:52 AM   Result Value Ref Range     81 - 234 U/L   GLUCOSE, POC    Collection Time: 12/05/18  3:31 PM   Result Value Ref Range    Glucose (POC) 374 (H) 70 - 110 mg/dL       Procedures/imaging: see electronic medical records for all procedures/Xrays and details which were not copied into this note but were reviewed prior to creation of Plan      CC: Nancy Dc MD

## 2018-12-05 NOTE — PROGRESS NOTES
[facility] Progress Note Date: 2018 Name: Roberth Monet MRN: [id number] : 1945 Chemotherapy Cycle: C2 of 2 Rituximab Mr. Kia Alvarez was assessed and education was provided. Consent on chart. Patient stated Dr. Shira Hines will be admitting him today after his infusion because he had a hyperglycemic episode following his last treatment. Dr. Shira Hines paged for direction. Mr. Edwige Cesar vitals were reviewed. Visit Vitals /84 (BP 1 Location: Left arm, BP Patient Position: Sitting) Pulse 95 Temp 97.9 °F (36.6 °C) Resp 18 Ht 6' 1\" (1.854 m) Wt 91 kg (200 lb 9.6 oz) SpO2 99% BMI 26.47 kg/m² Patient Vitals for the past 12 hrs: 
 Temp Pulse Resp BP SpO2  
18 0826 97.9 °F (36.6 °C) 95 18 158/84 99 % IV started in right forearm w/22 gauge INT w/o difficulty. Good blood return obtained. Labs drawn, followed with 10 ml NS flush. Lab results were obtained and reviewed. Recent Results (from the past 12 hour(s)) CBC WITH 3 PART DIFF Collection Time: 18  8:52 AM  
Result Value Ref Range WBC 12.7 4.5 - 13.0 K/uL  
 RBC 2.85 (L) 4.10 - 5.10 M/uL HGB 9.4 (L) 12.0 - 16.0 g/dL HCT 27.2 (L) 36 - 48 % MCV 95.4 78 - 102 FL  
 MCH 33.0 25.0 - 35.0 PG  
 MCHC 34.6 31 - 37 g/dL  
 RDW 12.1 11.5 - 14.5 % PLATELET 280 861 - 829 K/uL NEUTROPHILS 79 (H) 40 - 70 % MIXED CELLS 10 0.1 - 17 % LYMPHOCYTES 11 (L) 14 - 44 % ABS. NEUTROPHILS 10.0 (H) 1.8 - 9.5 K/UL  
 ABS. MIXED CELLS 1.3 0.0 - 2.3 K/uL  
 ABS. LYMPHOCYTES 1.4 1.1 - 5.9 K/UL  
 DF AUTOMATED METABOLIC PANEL, COMPREHENSIVE Collection Time: 18  8:52 AM  
Result Value Ref Range Sodium 137 136 - 145 mmol/L Potassium 4.0 3.5 - 5.5 mmol/L Chloride 100 100 - 108 mmol/L  
 CO2 30 21 - 32 mmol/L Anion gap 7 3.0 - 18 mmol/L Glucose 228 (H) 74 - 99 mg/dL BUN 54 (H) 7.0 - 18 MG/DL  Creatinine 3.20 (H) 0.6 - 1.3 MG/DL  
 BUN/Creatinine ratio 17    
 GFR est AA 23 (L) >60 ml/min/1.73m2 GFR est non-AA 19 (L) >60 ml/min/1.73m2 Calcium 8.8 8.5 - 10.1 MG/DL Bilirubin, total 0.5 0.2 - 1.0 MG/DL  
 ALT (SGPT) 27 16 - 61 U/L  
 AST (SGOT) 23 15 - 37 U/L Alk. phosphatase 222 (H) 45 - 117 U/L Protein, total 6.5 6.4 - 8.2 g/dL Albumin 3.0 (L) 3.4 - 5.0 g/dL Globulin 3.5 2.0 - 4.0 g/dL A-G Ratio 0.9 0.8 - 1.7 LD Collection Time: 12/05/18  8:52 AM  
Result Value Ref Range  81 - 234 U/L Pre-medications of oral Tylenol 650 mg, IVP Benadryl 50 mg/10 ml, IVP Solu Medrol 125 mg/2 ml were administered as ordered and chemotherapy was initiated. Received return call from Dr. Johnny Vickers. He stated he will be direct admitting the patient into a medical bed after his discharge from [facility]. Nursing supervisor notified to request bed. Awaiting call back from NS. Rituxan 800 mg/200ml was infused via rapid infusion per MAR. 100 ml/hr for 30 minutes and 200 ml/hr for 60 minutes or until infusion completed. Rituxan completed at 1240. Awaiting a medical bed from the nursing supervisor. Patient resting quietly in bed. Lunch provided. Patient was observed for 110 minutes after infusion completed, no s/s reaction were noted. Extended observation period due to awaiting bed placement and family to return. IV flushed with 10 ml NS and remained intact for direct admission. No irritation or drainage noted at site. Mr. Yelitza Bansal tolerated infusion, and had no complaints at this time. Armband removed and shredded. Mr. Yelitza Bansal was discharged from [place] [address] in stable condition at 1425. He is to follow up with Dr. Johnny Vickers as scheduled. Patient will report to the admitting office upon leaving [facility] today for admission to room [facility]. Tariq Acuña RN December 5, 2018 [address]

## 2018-12-05 NOTE — PROGRESS NOTES

## 2018-12-06 VITALS
RESPIRATION RATE: 20 BRPM | HEART RATE: 91 BPM | SYSTOLIC BLOOD PRESSURE: 150 MMHG | OXYGEN SATURATION: 96 % | TEMPERATURE: 98.2 F | DIASTOLIC BLOOD PRESSURE: 84 MMHG | BODY MASS INDEX: 26.39 KG/M2 | WEIGHT: 200 LBS

## 2018-12-06 LAB
ANION GAP SERPL CALC-SCNC: 10 MMOL/L (ref 3–18)
BASOPHILS # BLD: 0 K/UL (ref 0–0.1)
BASOPHILS NFR BLD: 0 % (ref 0–2)
BUN SERPL-MCNC: 63 MG/DL (ref 7–18)
BUN/CREAT SERPL: 21
CALCIUM SERPL-MCNC: 8.9 MG/DL (ref 8.5–10.1)
CHLORIDE SERPL-SCNC: 103 MMOL/L (ref 100–108)
CO2 SERPL-SCNC: 25 MMOL/L (ref 21–32)
CREAT SERPL-MCNC: 3.06 MG/DL (ref 0.6–1.3)
DIFFERENTIAL METHOD BLD: ABNORMAL
EOSINOPHIL # BLD: 0 K/UL (ref 0–0.4)
EOSINOPHIL NFR BLD: 0 % (ref 0–5)
ERYTHROCYTE [DISTWIDTH] IN BLOOD BY AUTOMATED COUNT: 12.1 % (ref 11.6–14.5)
GLUCOSE BLD STRIP.AUTO-MCNC: 283 MG/DL (ref 70–110)
GLUCOSE BLD STRIP.AUTO-MCNC: 336 MG/DL (ref 70–110)
GLUCOSE BLD STRIP.AUTO-MCNC: 385 MG/DL (ref 70–110)
GLUCOSE SERPL-MCNC: 261 MG/DL (ref 74–99)
HCT VFR BLD AUTO: 26.6 % (ref 36–48)
HGB BLD-MCNC: 9.3 G/DL (ref 13–16)
LYMPHOCYTES # BLD: 0.9 K/UL (ref 0.9–3.6)
LYMPHOCYTES NFR BLD: 6 % (ref 21–52)
MCH RBC QN AUTO: 32.2 PG (ref 24–34)
MCHC RBC AUTO-ENTMCNC: 35 G/DL (ref 31–37)
MCV RBC AUTO: 92 FL (ref 74–97)
MONOCYTES # BLD: 0.3 K/UL (ref 0.05–1.2)
MONOCYTES NFR BLD: 2 % (ref 3–10)
NEUTS SEG # BLD: 13.5 K/UL (ref 1.8–8)
NEUTS SEG NFR BLD: 92 % (ref 40–73)
PLATELET # BLD AUTO: 358 K/UL (ref 135–420)
PMV BLD AUTO: 9.2 FL (ref 9.2–11.8)
POTASSIUM SERPL-SCNC: 4.3 MMOL/L (ref 3.5–5.5)
RBC # BLD AUTO: 2.89 M/UL (ref 4.7–5.5)
SODIUM SERPL-SCNC: 138 MMOL/L (ref 136–145)
WBC # BLD AUTO: 14.6 K/UL (ref 4.6–13.2)

## 2018-12-06 PROCEDURE — 85025 COMPLETE CBC W/AUTO DIFF WBC: CPT

## 2018-12-06 PROCEDURE — 74011250637 HC RX REV CODE- 250/637: Performed by: PHYSICIAN ASSISTANT

## 2018-12-06 PROCEDURE — 74011636637 HC RX REV CODE- 636/637: Performed by: HOSPITALIST

## 2018-12-06 PROCEDURE — 99218 HC RM OBSERVATION: CPT

## 2018-12-06 PROCEDURE — 74011250636 HC RX REV CODE- 250/636: Performed by: PHYSICIAN ASSISTANT

## 2018-12-06 PROCEDURE — 74011636637 HC RX REV CODE- 636/637: Performed by: PHYSICIAN ASSISTANT

## 2018-12-06 PROCEDURE — 96372 THER/PROPH/DIAG INJ SC/IM: CPT

## 2018-12-06 PROCEDURE — 36415 COLL VENOUS BLD VENIPUNCTURE: CPT

## 2018-12-06 PROCEDURE — 82962 GLUCOSE BLOOD TEST: CPT

## 2018-12-06 PROCEDURE — 80048 BASIC METABOLIC PNL TOTAL CA: CPT

## 2018-12-06 RX ORDER — INSULIN GLARGINE 100 [IU]/ML
45 INJECTION, SOLUTION SUBCUTANEOUS
Qty: 1 VIAL | Refills: 0 | Status: SHIPPED
Start: 2018-12-06

## 2018-12-06 RX ADMIN — HEPARIN SODIUM 5000 UNITS: 5000 INJECTION INTRAVENOUS; SUBCUTANEOUS at 08:50

## 2018-12-06 RX ADMIN — SODIUM CHLORIDE 125 ML/HR: 900 INJECTION, SOLUTION INTRAVENOUS at 06:30

## 2018-12-06 RX ADMIN — CARVEDILOL 12.5 MG: 12.5 TABLET, FILM COATED ORAL at 08:50

## 2018-12-06 RX ADMIN — INSULIN LISPRO 15 UNITS: 100 INJECTION, SOLUTION INTRAVENOUS; SUBCUTANEOUS at 11:53

## 2018-12-06 RX ADMIN — INSULIN GLARGINE 45 UNITS: 100 INJECTION, SOLUTION SUBCUTANEOUS at 08:51

## 2018-12-06 RX ADMIN — FUROSEMIDE 80 MG: 40 TABLET ORAL at 06:30

## 2018-12-06 NOTE — PROGRESS NOTES
Shift summary: POC blood glucose 305. Switched pt to very insulin resistant sliding scale per protocol. Humalog 12 units given and lantus 15 units given per MAR. Pt asking for apple juice multiple times. Education provided on glucose management. Pt stated \"Well it's not in the 600s\". Continuous education still needed. Pt with a history of a fall and periodic confusion per visitor. Pt refusing to have bed alarm turned on. Reminded pt to call for assistance when needing to get OOB. Pt refusing and stated \"I have my cane. I'm fine. \"

## 2018-12-06 NOTE — DISCHARGE INSTRUCTIONS

## 2018-12-06 NOTE — DIABETES MGMT
NUTRITION / GLYCEMIC CONTROL PLAN OF CARE      -known h/o T2DM, most recently uncontrolled r/t high dose steroids prior to Rituximab treatment; pt reports hyperglycemia > 600 mg/dl after last treatment  -pt followed by Endocrinologist on basal + mealtime + corrective coverage home regimen  Diabetes Management:    - recommend: continue current IP regimen, lunch POCT 385 mg/dl possibly r/t pt not receiving corrective coverage this morning   -recheck 2 hours after insulin injection and give correction again if BG > 250 mg/dl  - education: (see GC RN note)  - goals:     *BG will be in target range of  mg/dl (non-ICU) by 12/15   *PO intake will be 75% of meals offered by 12/15   *Pt will follow up with OP PCP for Diabetes Management by 1/30/19  - TDD:  47 (15 Lantus + 10 mealtime + 12 - Humalog Very Insulin Resistant Corrective Coverage)  - BG range: 228-385 mg/dl  - Hypo: no  - BG in target range (non-ICU: <140; ICU<180): [] Yes  [x] No  - Steroids: Solumedrol 125 mg   - Intake:    Patient Vitals for the past 100 hrs:   % Diet Eaten   12/06/18 0814 100 %   12/05/18 1847 100 %   Current Insulin regimen:   Lantus 45 units at breakfast  Lantus 15 units at dinner  Home medication/insulin regimen:  Lantus 45 units daily  Humalog 5 units + corrective coverage  Recent Glucose Results:   Lab Results   Component Value Date/Time     (H) 12/06/2018 05:00 AM     (H) 12/05/2018 04:06 PM    GLUCPOC 385 (H) 12/06/2018 11:34 AM    GLUCPOC 283 (H) 12/06/2018 08:16 AM    GLUCPOC 305 (H) 12/05/2018 09:03 PM       Adequate glycemic control PTA:  [] Yes  [x] No    HbA1c: equivalent  to ave BGlucose of 258 mg/dl for 2-3 months prior to admission    Lab Results   Component Value Date/Time    Hemoglobin A1c 10.6 (H) 12/05/2018 04:06 PM   Diet:   Active Orders   Diet    DIET DIABETIC CONSISTENT CARB Regular     Delane Osler MS, RN, CDE  Glycemic Control Team  [phone]  Pager [phone] (M-TH 8:00-4:30P)  *After Hours pager [phone]

## 2018-12-06 NOTE — PROGRESS NOTES
Chart reviewed. Pt admitted in obs status for hyperglycemia. CM will follow for transition of care needs. 0945  Met with pt at bedside. Pt lives alone. Pt states he has family nearby. Pts friend, Jerry Juan will pick him up at discharge. Pt states he has wheelchair, cane, walker for use. Pts home address confirmed per face sheet. [facility]. Pt states he sees MD Naomi Bell, MD Megan Howe for PCP. Pt states he is ready to discharge. No discharge concerns identified. CM will cont to be available. Reason for Admission:   Per H&P, pt \"is a 74 y.o. male who has a history of hypertension, Type 2 IDDM, chronic kidney disease stage 4, and Pauci-immune GN. Surgical history includes \"a metal plate in my neck\", but doesn't remember specifics. He states that he was receiving a rituximab infusion today at the outpatient infusion center, for which he was premedicated with tylenol, benadryl, and IV steroids, when he was told his blood sugar was elevated. He was then directly admitted to medical services for care. The patient states that he has had issues with premedication before, resulting in hyperglycemia, requiring hospitalization. The patient denies any HA, changes in vision, confusion, dizziness, chest pain, SOB, SAHA, PND, abdominal pain, N/V/D, dysuria, hematuria, melena, hematochezia. \"    RRAT Score:    12 low                 Plan for utilizing home health:     None at this time                    Likelihood of Readmission:  Mod, considering PMHx                         Transition of Care Plan:     Discharge home with MD follow up, friend/family assistance                Care Management Interventions  Mode of Transport at Discharge:  Other (see comment)(friend)  Transition of Care Consult (CM Consult): Discharge Planning  Current Support Network: Lives Alone  Confirm Follow Up Transport: Self  Plan discussed with Pt/Family/Caregiver: Yes  Discharge Location  Discharge Placement: Home

## 2018-12-06 NOTE — DIABETES MGMT
Diabetes Patient/Family Education Record  Factors That  May Influence Patients Ability  to Learn or  Comply with Recommendations   []   Language barrier    []   Cultural needs   []   Motivation    []   Cognitive limitation    []   Physical   []   Education    []   Physiological factors   []   Hearing/vision/speaking impairment   []   Caodaism beliefs    []   Financial factors   []  Other:   [x]  No factors identified at this time.      Person Instructed:   [x]   Patient   []   Family   [x]  Other     Preference for Learning:   [x]   Verbal   []   Written   []  Demonstration     Level of Comprehension & Competence:    [x]  Good                                      [] Fair                                     []  Poor                             []  Needs Reinforcement   [x]  Teachback completed    Education Component:   [x]  Medication management, including confirmation of home regimen    [x]  Nutritional management -obtain usual meal pattern; consistent meal planning pt reports he consumes 3 meals a day    []  Exercise   []  Signs, symptoms, and treatment of hyperglycemia and hypoglycemia   [x] Prevention, recognition and treatment of hypoglycemia; denies hypos at home   [x]  Importance of blood glucose monitoring; SMBG 3-4x/day    []  Instruction on use of the blood glucose meter   [x]  Discuss the importance of HbA1C monitoring    []  Sick day guidelines   []  Proper use and disposal of lancets, needles, syringes or insulin pens (if appropriate)   []  Potential long-term complications (retinopathy, kidney disease, neuropathy, foot care)   [] Information about whom to contact in case of emergency or for more information    [x]  Goal:  Patient/family will demonstrate understanding of Diabetes Self Management Skills by: 1/30/19  Plan for post-discharge education or self-management support:    [] Outpatient class schedule provided            [] Patient Declined    [] Scheduled for outpatient classes (date) _______  Verify:  Does patient understand how diabetes medications work? yes  Does patient know what their most recent A1c is? Yes  Does patient monitor glucose at home? yes  Does patient have a glucometer, testing supplies or difficulty obtaining diabetes medications?  Yes, pt has trouble obtaining glucometer strips     Alexandra De Leon MS, RN, CDE  Glycemic Control Team  [phone]  Pager [phone] (M-TH 8:00-4:30P)  *After Hours pager [phone]

## 2018-12-06 NOTE — ROUTINE PROCESS
Bedside shift change report given to LITA Arnold (oncoming nurse) by Polly Brooke RN (offgoing nurse). Report included the following information SBAR, Kardex, Intake/Output and MAR.

## 2018-12-06 NOTE — PROGRESS NOTES
INITIAL NUTRITION ASSESSMENT     RECOMMENDATIONS/PLAN:   Continue w/ POC  Monitor labs/lytes, PO intake, BM, skin integrity, nutritional status  REASON FOR ASSESSMENT:     [x]  RN Referral:    [x] MST score >/=2  Malnutrition Screening Tool (MST):  Recently Lost Weight Without Trying: Yes  If Yes, How Much Weight Loss: 14 - 23 lbs  Eating Poorly Due to Decreased Appetite: Yes  MST Score: 3     NUTRITION ASSESSMENT:   Client History: 74 yrs old Male admitted with IDDM type 2 w/ hyperglycemia, HTN, hyperlipidemia, CKD stage 4, Pauci-Immune GN   PMHx: arthritis, autoimmune disease, CKD, diabetes, HTN, thyroid disease  Cultural/Church Food Preferences: None Identified    FOOD/NUTRITION HISTORY  Diet History: Pt reported his appetite to be good PTA and now. Pt stated it would be better at home. Pt reported no weight loss.   Food Allergies:  [x] NKFA     Pertinent PTA Medications: coreg, lasix, cranberry extract, humalog, iron, MVI, KCl, probiotic, vit D3, biotin    NUTRITION INTAKE   Diet Order:  Consistent Carb      Average PO Intake:       Patient Vitals for the past 100 hrs:   % Diet Eaten   12/06/18 0814 100 %   12/05/18 1847 100 %      Pertinent Medications:  [x] Reviewed; heparin, lasix, coreg   Electrolyte Replacement Protocol: []K  []Mg  []PO4    Insulin:  [x] SSI  [x] Pre-meal   []  Basal   [] Drip  [] None  Pt expected to meet estimated nutrient needs through next review:          [x]  Yes     [] No; ANTHROPOMETRICS  Height:   6'1\" (1.854 m)      Weight: 90.7 kg (200 lb)    BMI: 26.4 kg/m^2  -  overweight (25.0%-29.9% BMI)        Weight change: no significant wt loss noted per chart hx; 208 lb-6/4/18                                 Comparison to Reference Standards:  IBW: 184 lbs      %IBW: 109%      AdjBW: n/a    NUTRITION-FOCUSED PHYSICAL ASSESSMENT  Skin: No PU     GI:  No BM    BIOCHEMICAL DATA & MEDICAL TESTS  Pertinent Labs:  [x] Reviewed;  Glucose: 261, BUN: 63, Creatinine: 3.06, GFR: 20    NUTRITION PRESCRIPTION  Calories: 4958-9988 kcal/day based on 30-35 kcal/kg  Protein: 54-73 g/day based on .6 - .8 g/kg  Fluid: 6880-1061 ml/day based on 1 kcal/ml     NUTRITION DIAGNOSES:   1. Altered nutrition related laboratory value (HgA1C) related to diabetes as evidenced by HgA1C of 10.6    NUTRITION INTERVENTIONS:   INTERVENTIONS:        GOALS:  1. Continue w/ POC 1. Continue w/ POC by next review 5 days             LEARNING NEEDS (Diet, Supplementation, Food/Nutrient-Drug Interaction):   [x] None Identified  [] Inpatient education provided/documented    [] Identified and patient:  [] Declined     [] Was not appropriate/indicated  NUTRITION MONITORING /EVALUATION:   Follow PO intake  Monitor wt  Monitor renal labs, electrolytes, fluid status  Monitor for additional supplement needs    [] Participated in Interdisciplinary Rounds  [x] Interdisciplinary Care Plan Reviewed/Documented  DISCHARGE NUTRITION RECOMMENDATIONS ADDRESSED:     [x] To be determined closer to discharge    NUTRITION RISK:     [x]  At risk                     []  Not currently at risk     Will follow-up per policy.   Mick Obrien, Dietetic Interm  [phone]

## 2018-12-06 NOTE — DIABETES MGMT
NUTRITIONAL ASSESSMENT GLYCEMIC CONTROL/ PLAN OF CARE     Lily Byers           74 y.o.           12/5/2018                   PMHx: Autoimmune Disease ( Pauci-immune RPGN), Arthritis, CKD, Diabetes, HTN    Brief Updated: Pt here for OBSERVATION. Pt found to be in hyperglycemic state in OP treatment center. INTERVENTIONS/PLAN:   -Recommend Diabetic Consistent Carbohydrate Diet  -Diabetes Self Mgt Education    ASSESSMENT:   Nutritional Status:  Overweight per BMI 26.4 kg/m2 (25.0-29.9 kg/m2)     Diabetes Management:     Recent Glucose Results:   Lab Results   Component Value Date/Time     (H) 12/06/2018 05:00 AM     (H) 12/05/2018 04:06 PM    GLUCPOC 336 (H) 12/06/2018 01:38 PM    GLUCPOC 385 (H) 12/06/2018 11:34 AM    GLUCPOC 283 (H) 12/06/2018 08:16 AM     Within target range (non-ICU: <140; ICU<180): [x] Yes   []  No    Current Insulin regimen:    Lantus  15 units at bedtime  Lantus 45 units daily  Humalog 10 units w/ meals  Humalog, corrective, very insulin resistant    Home medication/insulin regimen:     HbA1c:(12/5/18) 10.6% equivalent to average blood glucose level 258 mg/dL. Adequate glycemic control PTA:  [] Yes  [x] No       SUBJECTIVE/OBJECTIVE:   Information obtained from: Unable to speak w/ pt. CDE met w/ pt and provided Diabetes Self-Mgt Education. Please see Diabetes Mgt Education note. Will continue to follow per policy.      Diet: DIET DIABETIC CONSISTENT CARB      Patient Vitals for the past 100 hrs:   % Diet Eaten   12/06/18 0814 100 %   12/05/18 1847 100 %         Medications: [x]                Reviewed     Most Recent POC Glucose:   Recent Labs     12/06/18  0500 12/05/18  1606 12/05/18  0852   * 365* 228*         Labs:   Lab Results   Component Value Date/Time    Hemoglobin A1c 10.6 (H) 12/05/2018 04:06 PM     Lab Results   Component Value Date/Time    Sodium 138 12/06/2018 05:00 AM    Potassium 4.3 12/06/2018 05:00 AM    Chloride 103 12/06/2018 05:00 AM    CO2 25 12/06/2018 05:00 AM    Anion gap 10 12/06/2018 05:00 AM    Glucose 261 (H) 12/06/2018 05:00 AM    BUN 63 (H) 12/06/2018 05:00 AM    Creatinine 3.06 (H) 12/06/2018 05:00 AM    Calcium 8.9 12/06/2018 05:00 AM    Albumin 3.0 (L) 12/05/2018 04:06 PM       Anthropometrics: BMI (calculated): 26.4  Wt Readings from Last 1 Encounters:   12/05/18 90.7 kg (200 lb)      Ht Readings from Last 1 Encounters:   12/05/18 6' 1\" (1.854 m)       Estimated Nutrition Needs: 2026 kcal/day (MSJ x 1.2 ), 54 g PRO/day ( 0.6 g PRO/kg), 2026 mL fluid/day (1 mL fluid/kcal)  Based on:   [x]          Actual BW: 90.7 kg    Nutrition Diagnoses: Altered nutrition-related lab value related to_as evidenced by A1C 10.6%. Nutrition Interventions:   -Recommend Diabetes Consistent Carbohydrate Education. Goal:   Blood glucose will be within target range of  mg/dL by 12/9/18.        Nutrition Monitoring and Evaluation      []     Monitor po intake on meal rounds  [x]     Continue inpatient monitoring and intervention  []     Other:      Nutrition Risk:  []   High     []  Moderate    [x]  Minimal/Uncompromised    Keven Hemphill RD  pgr [phone]

## 2018-12-06 NOTE — PROGRESS NOTES
Problem: Falls - Risk of  Goal: *Absence of Falls  Document Aleyda Fall Risk and appropriate interventions in the flowsheet.   Outcome: Progressing Towards Goal  Fall Risk Interventions:  Mobility Interventions: Assess mobility with egress test, Communicate number of staff needed for ambulation/transfer, Utilize walker, cane, or other assistive device         Medication Interventions: Evaluate medications/consider consulting pharmacy, Patient to call before getting OOB, Teach patient to arise slowly         History of Falls Interventions: Consult care management for discharge planning, Door open when patient unattended, Bed/chair exit alarm, Evaluate medications/consider consulting pharmacy, Investigate reason for fall, Room close to nurse's station(bed alarm when family not present, falls d/t weakness)

## 2018-12-06 NOTE — DISCHARGE SUMMARY
Discharge Summary    Patient: Sean Marques MRN: [id number]  CSN: [id number]    YOB: 1945  Age: 74 y.o. Sex: male    DOA: 12/5/2018 LOS:  LOS: 1 day   Discharge Date:      Primary Care Provider:  Gregory Rios MD    Admission Diagnoses: hyyperglycemia  Hyperglycemia    Discharge Diagnoses:    Problem List as of 12/6/2018 Never Reviewed          Codes Class Noted - Resolved    * (Principal) Hyperglycemia ICD-10-CM: R73.9  ICD-9-CM: 790.29  12/5/2018 - Present        Essential hypertension ICD-10-CM: I10  ICD-9-CM: 401.9  12/5/2018 - Present        Diabetes mellitus, type II, insulin dependent ([facility].) ICD-10-CM: E11.9, Z79.4  ICD-9-CM: 250.00, V58.67  12/5/2018 - Present        Hyperlipidemia ICD-10-CM: E78.5  ICD-9-CM: 272.4  12/5/2018 - Present        Pauci-immune RPGN (rapidly progressive glomerulonephritis) ICD-10-CM: N01.9  ICD-9-CM: 583.4  12/5/2018 - Present              Discharge Medications:     Current Discharge Medication List      CONTINUE these medications which have CHANGED    Details   insulin glargine (LANTUS) 100 unit/mL injection 45 Units by SubCUTAneous route Daily (before breakfast). Qty: 1 Vial, Refills: 0         CONTINUE these medications which have NOT CHANGED    Details   carvedilol (COREG) 12.5 mg tablet Take 12.5 mg by mouth daily. cranberry extract 425 mg cap Take 425 mg by mouth daily. furosemide (LASIX) 80 mg tablet Take 80 mg by mouth two (2) times a day. insulin lispro (HUMALOG) 100 unit/mL kwikpen by SubCUTAneous route as needed. iron 18 mg tab Take  by mouth. Magnesium Oxide 500 mg cap Take  by mouth.      multivit-min/folic/vit K/lycop (ONE-A-DAY MEN'S 50 PLUS PO) Take  by mouth.      potassium chloride (KLOR-CON M20) 20 mEq tablet Take 20 mEq by mouth two (2) times a day. B.infantis-B.ani-B.long-B.bifi (PROBIOTIC 4X) 10-15 mg TbEC Take  by mouth. simvastatin (ZOCOR) 20 mg tablet Take  by mouth nightly.       triamcinolone acetonide (KENALOG) 0.1 % ointment Apply  to affected area two (2) times a day. use thin layer      cholecalciferol (VITAMIN D3) 1,000 unit cap Take 1,000 Units by mouth two (2) times a day. Biotin 2,500 mcg cap Take  by mouth. Discharge Condition: Stable    Procedures : None    Consults: None      PHYSICAL EXAM    Visit Vitals  /84 (BP 1 Location: Left arm, BP Patient Position: At rest)   Pulse 91   Temp 98.2 °F (36.8 °C)   Resp 20   Wt 90.7 kg (200 lb)   SpO2 96%   BMI 26.39 kg/m²     General: Elderly white male. Awake, cooperative, no acute distress    HEENT: NC, Atraumatic. PERRLA, EOMI. Anicteric sclerae. Lungs:  CTA Bilaterally. No Wheezing/Rhonchi/Rales. Heart:  Regular  rhythm,  No murmur, No Rubs, No Gallops  Abdomen: Soft, Non distended, Non tender. +Bowel sounds,   Extremities: No c/c/e  Psych:   Not anxious or agitated. Neurologic:  No acute neurological deficits. Admission HPI : Candice Roman is a 74 y.o. male who has a history of hypertension, Type 2 IDDM, chronic kidney disease stage 4, and Pauci-immune GN. Surgical history includes \"a metal plate in my neck\", but doesn't remember specifics. He states that he was receiving a rituximab infusion today at the outpatient infusion center, for which he was premedicated with tylenol, benadryl, and IV steroids, when he was told his blood sugar was elevated. He was then directly admitted to medical services for care. The patient states that he has had issues with premedication before, resulting in hyperglycemia, requiring hospitalization. The patient denies any HA, changes in vision, confusion, dizziness, chest pain, SOB, SAHA, PND, abdominal pain, N/V/D, dysuria, hematuria, melena, hematochezia. Hospital Course : This patient was admitted to medical services on December 5, 2018 for hyperglycemia following a Rituxan infusion.  Other admission diagnoses include insulin dependent type 2 DM, hypertension, hyperlipidemia, chronic kidney disease, and Pauci-immune glomerulonephritis. The patient was admitted to the medical unit for further care. He was placed on his usual basal insulin regimen, and was given a scheduled 10 units of Humalog with meals with additional sliding scale coverage. His blood sugars remained elevated, as he received a 125mg dose of IV Solumedrol prior to his rituxan treatment. His labs were monitored and his AG remained normal, and his electrolytes remained in good balance. The patient's other chronic medical conditions were managed using his usual medication regimen. The patient will be discharged today in stable condition. He was instructed to resume his usual insulin regimen upon discharge home, and to follow closely with his PCP. He will see Dr Viridiana House next month for follow up of his GN. Activity: Activity as tolerated    Diet: Diabetic Diet    Follow-up: PCP; Nephrology    Disposition: Home    Minutes spent on discharge: 28       Labs: Results:       Chemistry Recent Labs     12/06/18  0500 12/05/18  1606 12/05/18  0852   * 365* 228*    136 137   K 4.3 4.8 4.0    102 100   CO2 25 25 30   BUN 63* 57* 54*   CREA 3.06* 3.35* 3.20*   CA 8.9 8.7 8.8   AGAP 10 9 7   BUCR 21 17 17   AP  --  251* 222*   TP  --  6.5 6.5   ALB  --  3.0* 3.0*   GLOB  --  3.5 3.5   AGRAT  --  0.9 0.9      CBC w/Diff Recent Labs     12/06/18  0500 12/05/18  0852   WBC 14.6* 12.7   RBC 2.89* 2.85*   HGB 9.3* 9.4*   HCT 26.6* 27.2*    396   GRANS 92* 79*   LYMPH 6* 11*   EOS 0  --       Cardiac Enzymes No results for input(s): CPK, CKND1, ISAK in the last 72 hours. No lab exists for component: CKRMB, TROIP   Coagulation No results for input(s): PTP, INR, APTT in the last 72 hours.     No lab exists for component: INREXT    Lipid Panel No results found for: CHOL, CHOLPOCT, CHOLX, CHLST, CHOLV, 058914, HDL, LDL, LDLC, DLDLP, 349010, VLDLC, VLDL, TGLX, TRIGL, TRIGP, TGLPOCT, CHHD, CHHDX BNP No results for input(s): BNPP in the last 72 hours. Liver Enzymes Recent Labs     12/05/18  1606   TP 6.5   ALB 3.0*   *   SGOT 37      Thyroid Studies No results found for: T4, T3U, TSH, TSHEXT         Significant Diagnostic Studies: No results found. No results found for this or any previous visit.         CC: Toya Santoro MD

## 2018-12-06 NOTE — PROGRESS NOTES
Summary - Pt comfortable since arrival (direct admit to hospitalist team). No c/o pain. Placed on SSI and routine coverage insulin regimen with close monitoring of blood glucose. Pt in agreement with tx plan and in good spirits. Patient Vitals for the past 12 hrs:   Temp Pulse Resp BP SpO2   12/05/18 1942 98.5 °F (36.9 °C) 88 18 136/78 92 %   12/05/18 1538 97.7 °F (36.5 °C) 82 18 131/79 95 %     Bedside shift change report given to Carmelina North RN (oncoming nurse) by Dario Franklin RN (offgoing nurse). Report included the following information SBAR, Kardex, Intake/Output, MAR, Recent Results and Med Rec Status.

## 2018-12-06 NOTE — PROGRESS NOTES
Patient alert and oriented X4. Per RANCHO Valderrama, patient will d/c today after 11:30 am blood sugar check. Patient last blood sugar 323, previously 383. Salazar VICKERS made aware. D/c instructions given at 15:00.   Visit Vitals  /84 (BP 1 Location: Left arm, BP Patient Position: At rest)   Pulse 91   Temp 98.2 °F (36.8 °C)   Resp 20   Wt 90.7 kg (200 lb)   SpO2 96%   BMI 26.39 kg/m²

## 2018-12-06 NOTE — PROGRESS NOTES
Social visit  PT is being treated for Hyperglycemia due to IV steroids.    IF discharged, he will see me first of week of Jan.

## 2018-12-06 NOTE — PROGRESS NOTES
Problem: Falls - Risk of  Goal: *Absence of Falls  Document Aleyda Fall Risk and appropriate interventions in the flowsheet.   Outcome: Progressing Towards Goal  Fall Risk Interventions:  Mobility Interventions: Assess mobility with egress test, Communicate number of staff needed for ambulation/transfer         Medication Interventions: Assess postural VS orthostatic hypotension, Evaluate medications/consider consulting pharmacy    Elimination Interventions: Bed/chair exit alarm, Call light in reach, Patient to call for help with toileting needs    History of Falls Interventions: Bed/chair exit alarm, Evaluate medications/consider consulting pharmacy

## 2018-12-06 NOTE — ROUTINE PROCESS
Bedside shift change report given to Zully Arcos RN (oncoming nurse) by Tre Resendiz RN (offgoing nurse). Report included the following information SBAR, Kardex, MAR, Recent Results and Alarm Parameters .

## 2018-12-06 NOTE — ROUTINE PROCESS
Bedside and Verbal shift change report given to LUBNA Nobles RN (oncoming nurse) by Isacc Maharaj RN   (offgoing nurse). Report included the following information SBAR, Kardex, MAR and Recent Results.

## 2018-12-06 NOTE — PROGRESS NOTES
Problem: Falls - Risk of  Goal: *Absence of Falls  Document Aleyda Fall Risk and appropriate interventions in the flowsheet.   Outcome: Progressing Towards Goal  Fall Risk Interventions:  Mobility Interventions: Patient to call before getting OOB         Medication Interventions: Evaluate medications/consider consulting pharmacy, Patient to call before getting OOB, Teach patient to arise slowly    Elimination Interventions: Bed/chair exit alarm, Call light in reach, Patient to call for help with toileting needs    History of Falls Interventions: Bed/chair exit alarm

## 2022-03-19 PROBLEM — I10 ESSENTIAL HYPERTENSION: Status: ACTIVE | Noted: 2018-12-05

## 2022-03-19 PROBLEM — E11.9 DIABETES MELLITUS, TYPE II, INSULIN DEPENDENT (HCC): Status: ACTIVE | Noted: 2018-12-05

## 2022-03-19 PROBLEM — Z79.4 DIABETES MELLITUS, TYPE II, INSULIN DEPENDENT (HCC): Status: ACTIVE | Noted: 2018-12-05

## 2022-03-19 PROBLEM — E78.5 HYPERLIPIDEMIA: Status: ACTIVE | Noted: 2018-12-05

## 2022-03-19 PROBLEM — N01.9 PAUCI-IMMUNE RPGN (RAPIDLY PROGRESSIVE GLOMERULONEPHRITIS): Status: ACTIVE | Noted: 2018-12-05

## 2022-03-19 PROBLEM — R73.9 HYPERGLYCEMIA: Status: ACTIVE | Noted: 2018-12-05

## 2023-08-28 PROBLEM — N18.9 ANEMIA ASSOCIATED WITH CHRONIC RENAL FAILURE: Status: ACTIVE | Noted: 2023-08-28

## 2023-08-28 PROBLEM — D63.1 ANEMIA ASSOCIATED WITH CHRONIC RENAL FAILURE: Status: ACTIVE | Noted: 2023-08-28

## 2023-08-28 RX ORDER — EPINEPHRINE 1 MG/ML
0.3 INJECTION, SOLUTION, CONCENTRATE INTRAVENOUS PRN
Status: CANCELLED | OUTPATIENT
Start: 2023-08-29

## 2023-08-28 RX ORDER — SODIUM CHLORIDE 9 MG/ML
INJECTION, SOLUTION INTRAVENOUS CONTINUOUS
Status: CANCELLED | OUTPATIENT
Start: 2023-08-29

## 2023-08-28 RX ORDER — ALBUTEROL SULFATE 90 UG/1
4 AEROSOL, METERED RESPIRATORY (INHALATION) PRN
Status: CANCELLED | OUTPATIENT
Start: 2023-08-29

## 2023-08-28 RX ORDER — ONDANSETRON 2 MG/ML
8 INJECTION INTRAMUSCULAR; INTRAVENOUS
Status: CANCELLED | OUTPATIENT
Start: 2023-08-29

## 2023-08-28 RX ORDER — DIPHENHYDRAMINE HYDROCHLORIDE 50 MG/ML
50 INJECTION INTRAMUSCULAR; INTRAVENOUS
Status: CANCELLED | OUTPATIENT
Start: 2023-08-29

## 2023-08-28 RX ORDER — ACETAMINOPHEN 325 MG/1
650 TABLET ORAL
Status: CANCELLED | OUTPATIENT
Start: 2023-08-29

## 2023-08-29 ENCOUNTER — HOSPITAL ENCOUNTER (OUTPATIENT)
Facility: HOSPITAL | Age: 78
Setting detail: INFUSION SERIES
End: 2023-08-29
Payer: MEDICARE

## 2023-08-29 VITALS
SYSTOLIC BLOOD PRESSURE: 144 MMHG | RESPIRATION RATE: 18 BRPM | DIASTOLIC BLOOD PRESSURE: 70 MMHG | TEMPERATURE: 98 F | HEART RATE: 72 BPM

## 2023-08-29 DIAGNOSIS — D63.1 ANEMIA ASSOCIATED WITH CHRONIC RENAL FAILURE: Primary | ICD-10-CM

## 2023-08-29 DIAGNOSIS — N18.9 ANEMIA ASSOCIATED WITH CHRONIC RENAL FAILURE: Primary | ICD-10-CM

## 2023-08-29 LAB
BASOPHILS # BLD: 0.1 K/UL (ref 0–0.1)
BASOPHILS NFR BLD: 1 % (ref 0–2)
DIFFERENTIAL METHOD BLD: ABNORMAL
EOSINOPHIL # BLD: 0.5 K/UL (ref 0–0.4)
EOSINOPHIL NFR BLD: 8 % (ref 0–5)
ERYTHROCYTE [DISTWIDTH] IN BLOOD BY AUTOMATED COUNT: 14 % (ref 11.6–14.5)
HCT VFR BLD AUTO: 25.2 % (ref 36–48)
HGB BLD-MCNC: 8.5 G/DL (ref 13–16)
IMM GRANULOCYTES # BLD AUTO: 0 K/UL (ref 0–0.04)
IMM GRANULOCYTES NFR BLD AUTO: 0 % (ref 0–0.5)
LYMPHOCYTES # BLD: 1.4 K/UL (ref 0.9–3.6)
LYMPHOCYTES NFR BLD: 23 % (ref 21–52)
MCH RBC QN AUTO: 32.2 PG (ref 24–34)
MCHC RBC AUTO-ENTMCNC: 33.7 G/DL (ref 31–37)
MCV RBC AUTO: 95.5 FL (ref 78–100)
MONOCYTES # BLD: 0.6 K/UL (ref 0.05–1.2)
MONOCYTES NFR BLD: 10 % (ref 3–10)
NEUTS SEG # BLD: 3.6 K/UL (ref 1.8–8)
NEUTS SEG NFR BLD: 58 % (ref 40–73)
NRBC # BLD: 0 K/UL (ref 0–0.01)
NRBC BLD-RTO: 0 PER 100 WBC
PLATELET # BLD AUTO: 216 K/UL (ref 135–420)
PMV BLD AUTO: 10.1 FL (ref 9.2–11.8)
RBC # BLD AUTO: 2.64 M/UL (ref 4.35–5.65)
WBC # BLD AUTO: 6.3 K/UL (ref 4.6–13.2)

## 2023-08-29 PROCEDURE — 85025 COMPLETE CBC W/AUTO DIFF WBC: CPT

## 2023-08-29 PROCEDURE — 96372 THER/PROPH/DIAG INJ SC/IM: CPT

## 2023-08-29 PROCEDURE — 6360000002 HC RX W HCPCS: Performed by: INTERNAL MEDICINE

## 2023-08-29 PROCEDURE — 36415 COLL VENOUS BLD VENIPUNCTURE: CPT

## 2023-08-29 RX ORDER — EPINEPHRINE 1 MG/ML
0.3 INJECTION, SOLUTION, CONCENTRATE INTRAVENOUS PRN
Status: CANCELLED | OUTPATIENT
Start: 2023-09-05

## 2023-08-29 RX ORDER — DUTASTERIDE AND TAMSULOSIN HYDROCHLORIDE CAPSULES .5; .4 MG/1; MG/1
CAPSULE ORAL
COMMUNITY

## 2023-08-29 RX ORDER — TAMSULOSIN HYDROCHLORIDE 0.4 MG/1
0.4 CAPSULE ORAL
COMMUNITY
Start: 2021-01-16

## 2023-08-29 RX ORDER — FINASTERIDE 5 MG/1
5 TABLET, FILM COATED ORAL DAILY
COMMUNITY
Start: 2023-08-11

## 2023-08-29 RX ORDER — LOPERAMIDE HYDROCHLORIDE 2 MG/1
CAPSULE ORAL
COMMUNITY
Start: 2023-06-08

## 2023-08-29 RX ORDER — SODIUM CHLORIDE 9 MG/ML
INJECTION, SOLUTION INTRAVENOUS CONTINUOUS
Status: CANCELLED | OUTPATIENT
Start: 2023-09-05

## 2023-08-29 RX ORDER — ALBUTEROL SULFATE 90 UG/1
AEROSOL, METERED RESPIRATORY (INHALATION)
COMMUNITY
Start: 2023-08-11

## 2023-08-29 RX ORDER — FERROUS SULFATE 325(65) MG
325 TABLET ORAL DAILY
COMMUNITY
Start: 2022-08-15

## 2023-08-29 RX ORDER — FAMOTIDINE 20 MG/1
20 TABLET, FILM COATED ORAL DAILY
COMMUNITY
Start: 2023-07-11

## 2023-08-29 RX ORDER — SIMVASTATIN 20 MG
20 TABLET ORAL DAILY
COMMUNITY
Start: 2017-08-25

## 2023-08-29 RX ORDER — ONDANSETRON 4 MG/1
TABLET, ORALLY DISINTEGRATING ORAL
COMMUNITY
Start: 2023-06-10

## 2023-08-29 RX ORDER — INSULIN LISPRO 100 [IU]/ML
10 INJECTION, SOLUTION INTRAVENOUS; SUBCUTANEOUS 3 TIMES DAILY PRN
COMMUNITY

## 2023-08-29 RX ORDER — NICOTINE POLACRILEX 2 MG
1 GUM BUCCAL DAILY
COMMUNITY

## 2023-08-29 RX ORDER — INSULIN GLARGINE 100 [IU]/ML
16 INJECTION, SOLUTION SUBCUTANEOUS EVERY MORNING
COMMUNITY
Start: 2022-08-15

## 2023-08-29 RX ORDER — CALCITRIOL 0.25 UG/1
CAPSULE, LIQUID FILLED ORAL
COMMUNITY
Start: 2023-08-28

## 2023-08-29 RX ORDER — INSULIN LISPRO 100 [IU]/ML
INJECTION, SOLUTION INTRAVENOUS; SUBCUTANEOUS
COMMUNITY
Start: 2023-08-05

## 2023-08-29 RX ORDER — ACETAMINOPHEN 325 MG/1
650 TABLET ORAL
Status: CANCELLED | OUTPATIENT
Start: 2023-09-05

## 2023-08-29 RX ORDER — ONDANSETRON 2 MG/ML
8 INJECTION INTRAMUSCULAR; INTRAVENOUS
Status: CANCELLED | OUTPATIENT
Start: 2023-09-05

## 2023-08-29 RX ORDER — DIPHENHYDRAMINE HYDROCHLORIDE 50 MG/ML
50 INJECTION INTRAMUSCULAR; INTRAVENOUS
Status: CANCELLED | OUTPATIENT
Start: 2023-09-05

## 2023-08-29 RX ORDER — FUROSEMIDE 80 MG
TABLET ORAL
COMMUNITY
Start: 2023-07-24

## 2023-08-29 RX ORDER — CARVEDILOL 3.12 MG/1
3.12 TABLET ORAL 2 TIMES DAILY WITH MEALS
COMMUNITY
Start: 2023-08-09

## 2023-08-29 RX ORDER — POTASSIUM CHLORIDE 20 MEQ/1
20 TABLET, EXTENDED RELEASE ORAL 2 TIMES DAILY
COMMUNITY

## 2023-08-29 RX ORDER — ALBUTEROL SULFATE 90 UG/1
4 AEROSOL, METERED RESPIRATORY (INHALATION) PRN
Status: CANCELLED | OUTPATIENT
Start: 2023-09-05

## 2023-08-29 RX ADMIN — EPOETIN ALFA-EPBX 40000 UNITS: 10000 INJECTION, SOLUTION INTRAVENOUS; SUBCUTANEOUS at 10:40

## 2023-08-29 ASSESSMENT — PAIN SCALES - GENERAL: PAINLEVEL_OUTOF10: 0

## 2023-09-26 ENCOUNTER — HOSPITAL ENCOUNTER (OUTPATIENT)
Facility: HOSPITAL | Age: 78
Setting detail: INFUSION SERIES
End: 2023-09-26
Payer: MEDICARE

## 2023-09-26 VITALS
OXYGEN SATURATION: 98 % | RESPIRATION RATE: 18 BRPM | DIASTOLIC BLOOD PRESSURE: 63 MMHG | SYSTOLIC BLOOD PRESSURE: 129 MMHG | HEART RATE: 72 BPM | TEMPERATURE: 98 F

## 2023-09-26 DIAGNOSIS — N18.9 ANEMIA ASSOCIATED WITH CHRONIC RENAL FAILURE: Primary | ICD-10-CM

## 2023-09-26 DIAGNOSIS — D63.1 ANEMIA ASSOCIATED WITH CHRONIC RENAL FAILURE: Primary | ICD-10-CM

## 2023-09-26 LAB
BASOPHILS # BLD: 0.1 K/UL (ref 0–0.1)
BASOPHILS NFR BLD: 1 % (ref 0–2)
DIFFERENTIAL METHOD BLD: ABNORMAL
EOSINOPHIL # BLD: 0.4 K/UL (ref 0–0.4)
EOSINOPHIL NFR BLD: 6 % (ref 0–5)
ERYTHROCYTE [DISTWIDTH] IN BLOOD BY AUTOMATED COUNT: 13.8 % (ref 11.6–14.5)
HCT VFR BLD AUTO: 24 % (ref 36–48)
HGB BLD-MCNC: 8 G/DL (ref 13–16)
IMM GRANULOCYTES # BLD AUTO: 0 K/UL (ref 0–0.04)
IMM GRANULOCYTES NFR BLD AUTO: 0 % (ref 0–0.5)
IRON SATN MFR SERPL: 18 % (ref 20–50)
IRON SERPL-MCNC: 49 UG/DL (ref 50–175)
LYMPHOCYTES # BLD: 1.7 K/UL (ref 0.9–3.6)
LYMPHOCYTES NFR BLD: 23 % (ref 21–52)
MCH RBC QN AUTO: 31.9 PG (ref 24–34)
MCHC RBC AUTO-ENTMCNC: 33.3 G/DL (ref 31–37)
MCV RBC AUTO: 95.6 FL (ref 78–100)
MONOCYTES # BLD: 0.7 K/UL (ref 0.05–1.2)
MONOCYTES NFR BLD: 10 % (ref 3–10)
NEUTS SEG # BLD: 4.4 K/UL (ref 1.8–8)
NEUTS SEG NFR BLD: 60 % (ref 40–73)
NRBC # BLD: 0 K/UL (ref 0–0.01)
NRBC BLD-RTO: 0 PER 100 WBC
PLATELET # BLD AUTO: 215 K/UL (ref 135–420)
PMV BLD AUTO: 9.7 FL (ref 9.2–11.8)
RBC # BLD AUTO: 2.51 M/UL (ref 4.35–5.65)
TIBC SERPL-MCNC: 273 UG/DL (ref 250–450)
WBC # BLD AUTO: 7.3 K/UL (ref 4.6–13.2)

## 2023-09-26 PROCEDURE — 6360000002 HC RX W HCPCS: Performed by: INTERNAL MEDICINE

## 2023-09-26 PROCEDURE — 96372 THER/PROPH/DIAG INJ SC/IM: CPT

## 2023-09-26 PROCEDURE — 83550 IRON BINDING TEST: CPT

## 2023-09-26 PROCEDURE — 85025 COMPLETE CBC W/AUTO DIFF WBC: CPT

## 2023-09-26 PROCEDURE — 83540 ASSAY OF IRON: CPT

## 2023-09-26 RX ORDER — ONDANSETRON 2 MG/ML
8 INJECTION INTRAMUSCULAR; INTRAVENOUS
Status: CANCELLED | OUTPATIENT
Start: 2023-10-24

## 2023-09-26 RX ORDER — DIPHENHYDRAMINE HYDROCHLORIDE 50 MG/ML
50 INJECTION INTRAMUSCULAR; INTRAVENOUS
Status: CANCELLED | OUTPATIENT
Start: 2023-10-24

## 2023-09-26 RX ORDER — ALBUTEROL SULFATE 90 UG/1
4 AEROSOL, METERED RESPIRATORY (INHALATION) PRN
Status: CANCELLED | OUTPATIENT
Start: 2023-10-24

## 2023-09-26 RX ORDER — SODIUM CHLORIDE 9 MG/ML
INJECTION, SOLUTION INTRAVENOUS CONTINUOUS
Status: CANCELLED | OUTPATIENT
Start: 2023-10-24

## 2023-09-26 RX ORDER — ACETAMINOPHEN 325 MG/1
650 TABLET ORAL
Status: CANCELLED | OUTPATIENT
Start: 2023-10-24

## 2023-09-26 RX ORDER — EPINEPHRINE 1 MG/ML
0.3 INJECTION, SOLUTION, CONCENTRATE INTRAVENOUS PRN
Status: CANCELLED | OUTPATIENT
Start: 2023-10-24

## 2023-09-26 RX ADMIN — EPOETIN ALFA-EPBX 40000 UNITS: 10000 INJECTION, SOLUTION INTRAVENOUS; SUBCUTANEOUS at 10:29

## 2023-09-26 ASSESSMENT — PAIN DESCRIPTION - LOCATION: LOCATION: KNEE

## 2023-09-26 ASSESSMENT — PAIN SCALES - GENERAL: PAINLEVEL_OUTOF10: 3

## 2023-10-24 ENCOUNTER — HOSPITAL ENCOUNTER (OUTPATIENT)
Facility: HOSPITAL | Age: 78
Setting detail: INFUSION SERIES
Discharge: HOME | End: 2023-10-24
Payer: MEDICARE

## 2023-10-24 VITALS
OXYGEN SATURATION: 100 % | HEART RATE: 74 BPM | TEMPERATURE: 98.2 F | RESPIRATION RATE: 16 BRPM | DIASTOLIC BLOOD PRESSURE: 74 MMHG | SYSTOLIC BLOOD PRESSURE: 131 MMHG

## 2023-10-24 DIAGNOSIS — D63.1 ANEMIA ASSOCIATED WITH CHRONIC RENAL FAILURE: Primary | ICD-10-CM

## 2023-10-24 DIAGNOSIS — N18.9 ANEMIA ASSOCIATED WITH CHRONIC RENAL FAILURE: Primary | ICD-10-CM

## 2023-10-24 LAB
BASO+EOS+MONOS # BLD AUTO: 1 K/UL (ref 0–2.3)
BASO+EOS+MONOS NFR BLD AUTO: 16 % (ref 0.1–17)
DIFFERENTIAL METHOD BLD: ABNORMAL
ERYTHROCYTE [DISTWIDTH] IN BLOOD BY AUTOMATED COUNT: 14.7 % (ref 11.5–14.5)
HCT VFR BLD AUTO: 30.1 % (ref 36–48)
HGB BLD-MCNC: 9.8 G/DL (ref 12–16)
LYMPHOCYTES # BLD: 1.8 K/UL (ref 1.1–5.9)
LYMPHOCYTES NFR BLD: 30 % (ref 14–44)
MCH RBC QN AUTO: 31.1 PG (ref 25–35)
MCHC RBC AUTO-ENTMCNC: 32.6 G/DL (ref 31–37)
MCV RBC AUTO: 95.6 FL (ref 78–102)
NEUTS SEG # BLD: 3.4 K/UL (ref 1.8–9.5)
NEUTS SEG NFR BLD: 54 % (ref 40–70)
PLATELET # BLD AUTO: 144 K/UL (ref 140–440)
RBC # BLD AUTO: 3.15 M/UL (ref 4.1–5.1)
WBC # BLD AUTO: 6.2 K/UL (ref 4.5–13)

## 2023-10-24 PROCEDURE — 96372 THER/PROPH/DIAG INJ SC/IM: CPT

## 2023-10-24 PROCEDURE — 6360000002 HC RX W HCPCS: Performed by: INTERNAL MEDICINE

## 2023-10-24 PROCEDURE — 36415 COLL VENOUS BLD VENIPUNCTURE: CPT

## 2023-10-24 PROCEDURE — 85025 COMPLETE CBC W/AUTO DIFF WBC: CPT

## 2023-10-24 RX ORDER — SODIUM CHLORIDE 9 MG/ML
INJECTION, SOLUTION INTRAVENOUS CONTINUOUS
OUTPATIENT
Start: 2023-11-21

## 2023-10-24 RX ORDER — ACETAMINOPHEN 325 MG/1
650 TABLET ORAL
OUTPATIENT
Start: 2023-11-21

## 2023-10-24 RX ORDER — ALBUTEROL SULFATE 90 UG/1
4 AEROSOL, METERED RESPIRATORY (INHALATION) PRN
OUTPATIENT
Start: 2023-11-21

## 2023-10-24 RX ORDER — EPINEPHRINE 1 MG/ML
0.3 INJECTION, SOLUTION, CONCENTRATE INTRAVENOUS PRN
OUTPATIENT
Start: 2023-11-21

## 2023-10-24 RX ORDER — ONDANSETRON 2 MG/ML
8 INJECTION INTRAMUSCULAR; INTRAVENOUS
OUTPATIENT
Start: 2023-11-21

## 2023-10-24 RX ORDER — DIPHENHYDRAMINE HYDROCHLORIDE 50 MG/ML
50 INJECTION INTRAMUSCULAR; INTRAVENOUS
OUTPATIENT
Start: 2023-11-21

## 2023-10-24 RX ADMIN — EPOETIN ALFA-EPBX 40000 UNITS: 40000 INJECTION, SOLUTION INTRAVENOUS; SUBCUTANEOUS at 11:12

## 2023-11-27 ENCOUNTER — HOSPITAL ENCOUNTER (OUTPATIENT)
Facility: HOSPITAL | Age: 78
Setting detail: INFUSION SERIES
Discharge: HOME | End: 2023-11-27
Payer: MEDICARE

## 2023-11-27 VITALS
OXYGEN SATURATION: 96 % | DIASTOLIC BLOOD PRESSURE: 68 MMHG | RESPIRATION RATE: 16 BRPM | TEMPERATURE: 98.2 F | SYSTOLIC BLOOD PRESSURE: 159 MMHG | HEART RATE: 75 BPM

## 2023-11-27 DIAGNOSIS — D63.1 ANEMIA ASSOCIATED WITH CHRONIC RENAL FAILURE: Primary | ICD-10-CM

## 2023-11-27 DIAGNOSIS — N18.9 ANEMIA ASSOCIATED WITH CHRONIC RENAL FAILURE: Primary | ICD-10-CM

## 2023-11-27 LAB
BASOPHILS # BLD: 0.1 K/UL (ref 0–0.1)
BASOPHILS NFR BLD: 1 % (ref 0–2)
DIFFERENTIAL METHOD BLD: ABNORMAL
EOSINOPHIL # BLD: 0.3 K/UL (ref 0–0.4)
EOSINOPHIL NFR BLD: 7 % (ref 0–5)
ERYTHROCYTE [DISTWIDTH] IN BLOOD BY AUTOMATED COUNT: 14.1 % (ref 11.6–14.5)
HCT VFR BLD AUTO: 27.7 % (ref 36–48)
HGB BLD-MCNC: 9.4 G/DL (ref 13–16)
IMM GRANULOCYTES # BLD AUTO: 0 K/UL (ref 0–0.04)
IMM GRANULOCYTES NFR BLD AUTO: 0 % (ref 0–0.5)
LYMPHOCYTES # BLD: 1.2 K/UL (ref 0.9–3.6)
LYMPHOCYTES NFR BLD: 28 % (ref 21–52)
MCH RBC QN AUTO: 32.4 PG (ref 24–34)
MCHC RBC AUTO-ENTMCNC: 33.9 G/DL (ref 31–37)
MCV RBC AUTO: 95.5 FL (ref 78–100)
MONOCYTES # BLD: 0.6 K/UL (ref 0.05–1.2)
MONOCYTES NFR BLD: 14 % (ref 3–10)
NEUTS SEG # BLD: 2.3 K/UL (ref 1.8–8)
NEUTS SEG NFR BLD: 50 % (ref 40–73)
NRBC # BLD: 0 K/UL (ref 0–0.01)
NRBC BLD-RTO: 0 PER 100 WBC
PLATELET # BLD AUTO: 145 K/UL (ref 135–420)
PMV BLD AUTO: 11.1 FL (ref 9.2–11.8)
RBC # BLD AUTO: 2.9 M/UL (ref 4.35–5.65)
WBC # BLD AUTO: 4.5 K/UL (ref 4.6–13.2)

## 2023-11-27 PROCEDURE — 36415 COLL VENOUS BLD VENIPUNCTURE: CPT

## 2023-11-27 PROCEDURE — 85025 COMPLETE CBC W/AUTO DIFF WBC: CPT

## 2023-11-27 PROCEDURE — 96372 THER/PROPH/DIAG INJ SC/IM: CPT

## 2023-11-27 PROCEDURE — 6360000002 HC RX W HCPCS: Performed by: INTERNAL MEDICINE

## 2023-11-27 RX ORDER — EPINEPHRINE 1 MG/ML
0.3 INJECTION, SOLUTION, CONCENTRATE INTRAVENOUS PRN
OUTPATIENT
Start: 2023-12-18

## 2023-11-27 RX ORDER — ONDANSETRON 2 MG/ML
8 INJECTION INTRAMUSCULAR; INTRAVENOUS
OUTPATIENT
Start: 2023-12-18

## 2023-11-27 RX ORDER — ALBUTEROL SULFATE 90 UG/1
4 AEROSOL, METERED RESPIRATORY (INHALATION) PRN
OUTPATIENT
Start: 2023-12-18

## 2023-11-27 RX ORDER — SODIUM CHLORIDE 9 MG/ML
INJECTION, SOLUTION INTRAVENOUS CONTINUOUS
OUTPATIENT
Start: 2023-12-18

## 2023-11-27 RX ORDER — ACETAMINOPHEN 325 MG/1
650 TABLET ORAL
OUTPATIENT
Start: 2023-12-18

## 2023-11-27 RX ORDER — VITAMIN B COMPLEX
1 CAPSULE ORAL DAILY
COMMUNITY

## 2023-11-27 RX ORDER — DIPHENHYDRAMINE HYDROCHLORIDE 50 MG/ML
50 INJECTION INTRAMUSCULAR; INTRAVENOUS
OUTPATIENT
Start: 2023-12-18

## 2023-11-27 RX ORDER — UBIDECARENONE 75 MG
50 CAPSULE ORAL DAILY
COMMUNITY

## 2023-11-27 RX ADMIN — EPOETIN ALFA-EPBX 40000 UNITS: 10000 INJECTION, SOLUTION INTRAVENOUS; SUBCUTANEOUS at 10:53

## 2023-12-09 ENCOUNTER — APPOINTMENT (OUTPATIENT)
Facility: HOSPITAL | Age: 78
End: 2023-12-09
Payer: MEDICARE

## 2023-12-09 ENCOUNTER — HOSPITAL ENCOUNTER (EMERGENCY)
Facility: HOSPITAL | Age: 78
Discharge: HOME | End: 2023-12-09
Payer: MEDICARE

## 2023-12-09 VITALS
WEIGHT: 180 LBS | OXYGEN SATURATION: 98 % | BODY MASS INDEX: 25.2 KG/M2 | TEMPERATURE: 98.7 F | HEIGHT: 71 IN | HEART RATE: 84 BPM | SYSTOLIC BLOOD PRESSURE: 152 MMHG | DIASTOLIC BLOOD PRESSURE: 84 MMHG | RESPIRATION RATE: 15 BRPM

## 2023-12-09 DIAGNOSIS — T82.49XA OTHER COMPLICATION OF VASCULAR DIALYSIS CATHETER, INITIAL ENCOUNTER (HCC): Primary | ICD-10-CM

## 2023-12-09 LAB
ALBUMIN SERPL-MCNC: 3.5 G/DL (ref 3.4–5)
ALBUMIN/GLOB SERPL: 0.9 (ref 0.8–1.7)
ALP SERPL-CCNC: 216 U/L (ref 45–117)
ALT SERPL-CCNC: 31 U/L (ref 16–61)
ANION GAP SERPL CALC-SCNC: 6 MMOL/L (ref 3–18)
APTT PPP: 62.8 SEC (ref 23–36.4)
AST SERPL-CCNC: 32 U/L (ref 10–38)
BASOPHILS # BLD: 0.1 K/UL (ref 0–0.1)
BASOPHILS NFR BLD: 2 % (ref 0–2)
BILIRUB SERPL-MCNC: 0.8 MG/DL (ref 0.2–1)
BUN SERPL-MCNC: 12 MG/DL (ref 7–18)
BUN/CREAT SERPL: 6 (ref 12–20)
CALCIUM SERPL-MCNC: 9.8 MG/DL (ref 8.5–10.1)
CHLORIDE SERPL-SCNC: 102 MMOL/L (ref 100–111)
CO2 SERPL-SCNC: 30 MMOL/L (ref 21–32)
CREAT SERPL-MCNC: 1.93 MG/DL (ref 0.6–1.3)
DIFFERENTIAL METHOD BLD: ABNORMAL
EOSINOPHIL # BLD: 0.3 K/UL (ref 0–0.4)
EOSINOPHIL NFR BLD: 7 % (ref 0–5)
ERYTHROCYTE [DISTWIDTH] IN BLOOD BY AUTOMATED COUNT: 15.2 % (ref 11.6–14.5)
GLOBULIN SER CALC-MCNC: 4.1 G/DL (ref 2–4)
GLUCOSE SERPL-MCNC: 192 MG/DL (ref 74–99)
HCT VFR BLD AUTO: 35 % (ref 36–48)
HGB BLD-MCNC: 11.7 G/DL (ref 13–16)
IMM GRANULOCYTES # BLD AUTO: 0 K/UL (ref 0–0.04)
IMM GRANULOCYTES NFR BLD AUTO: 0 % (ref 0–0.5)
INR PPP: 1 (ref 0.9–1.1)
LACTATE BLD-SCNC: 1.03 MMOL/L (ref 0.4–2)
LYMPHOCYTES # BLD: 1 K/UL (ref 0.9–3.6)
LYMPHOCYTES NFR BLD: 22 % (ref 21–52)
MAGNESIUM SERPL-MCNC: 2.3 MG/DL (ref 1.6–2.6)
MCH RBC QN AUTO: 32.8 PG (ref 24–34)
MCHC RBC AUTO-ENTMCNC: 33.4 G/DL (ref 31–37)
MCV RBC AUTO: 98 FL (ref 78–100)
MONOCYTES # BLD: 0.6 K/UL (ref 0.05–1.2)
MONOCYTES NFR BLD: 14 % (ref 3–10)
NEUTS SEG # BLD: 2.6 K/UL (ref 1.8–8)
NEUTS SEG NFR BLD: 56 % (ref 40–73)
NRBC # BLD: 0 K/UL (ref 0–0.01)
NRBC BLD-RTO: 0 PER 100 WBC
PLATELET # BLD AUTO: 132 K/UL (ref 135–420)
PMV BLD AUTO: 9.7 FL (ref 9.2–11.8)
POTASSIUM SERPL-SCNC: 3.8 MMOL/L (ref 3.5–5.5)
PROT SERPL-MCNC: 7.6 G/DL (ref 6.4–8.2)
PROTHROMBIN TIME: 13.7 SEC (ref 11.9–14.7)
RBC # BLD AUTO: 3.57 M/UL (ref 4.35–5.65)
SODIUM SERPL-SCNC: 138 MMOL/L (ref 136–145)
WBC # BLD AUTO: 4.6 K/UL (ref 4.6–13.2)

## 2023-12-09 PROCEDURE — 71045 X-RAY EXAM CHEST 1 VIEW: CPT

## 2023-12-09 PROCEDURE — 99284 EMERGENCY DEPT VISIT MOD MDM: CPT

## 2023-12-09 PROCEDURE — 80053 COMPREHEN METABOLIC PANEL: CPT

## 2023-12-09 PROCEDURE — 83605 ASSAY OF LACTIC ACID: CPT

## 2023-12-09 PROCEDURE — 6370000000 HC RX 637 (ALT 250 FOR IP): Performed by: PHYSICIAN ASSISTANT

## 2023-12-09 PROCEDURE — 87040 BLOOD CULTURE FOR BACTERIA: CPT

## 2023-12-09 PROCEDURE — 85730 THROMBOPLASTIN TIME PARTIAL: CPT

## 2023-12-09 PROCEDURE — 83735 ASSAY OF MAGNESIUM: CPT

## 2023-12-09 PROCEDURE — 85025 COMPLETE CBC W/AUTO DIFF WBC: CPT

## 2023-12-09 PROCEDURE — 85610 PROTHROMBIN TIME: CPT

## 2023-12-09 RX ORDER — CEPHALEXIN 500 MG/1
500 CAPSULE ORAL 2 TIMES DAILY
Qty: 10 CAPSULE | Refills: 0 | Status: SHIPPED | OUTPATIENT
Start: 2023-12-09 | End: 2023-12-09 | Stop reason: ALTCHOICE

## 2023-12-09 RX ORDER — CEPHALEXIN 250 MG/1
500 CAPSULE ORAL
Status: COMPLETED | OUTPATIENT
Start: 2023-12-09 | End: 2023-12-09

## 2023-12-09 RX ORDER — CEPHALEXIN 500 MG/1
500 CAPSULE ORAL 4 TIMES DAILY
Qty: 20 CAPSULE | Refills: 0 | Status: SHIPPED | OUTPATIENT
Start: 2023-12-09 | End: 2023-12-14

## 2023-12-09 RX ORDER — CEPHALEXIN 500 MG/1
500 CAPSULE ORAL 2 TIMES DAILY
Qty: 10 CAPSULE | Refills: 0 | Status: SHIPPED | OUTPATIENT
Start: 2023-12-09 | End: 2023-12-09 | Stop reason: SDUPTHER

## 2023-12-09 RX ADMIN — CEPHALEXIN 500 MG: 250 CAPSULE ORAL at 18:23

## 2023-12-09 ASSESSMENT — PAIN - FUNCTIONAL ASSESSMENT: PAIN_FUNCTIONAL_ASSESSMENT: NONE - DENIES PAIN

## 2023-12-09 NOTE — ED PROVIDER NOTES
Medication Sig Dispense Refill    cephALEXin (KEFLEX) 500 MG capsule Take 1 capsule by mouth 4 times daily for 5 days 20 capsule 0    Omega-3 Fatty Acids (OMEGA-3 PO) Take 1 tablet by mouth daily      b complex vitamins capsule Take 1 capsule by mouth daily      vitamin B-12 (CYANOCOBALAMIN) 100 MCG tablet Take 0.5 tablets by mouth daily      Probiotic Product (PROBIOTIC-10 PO) Take by mouth      albuterol sulfate HFA (PROVENTIL;VENTOLIN;PROAIR) 108 (90 Base) MCG/ACT inhaler TAKE 2 PUFFS BY MOUTH EVERY 6 HOURS AS NEEDED FOR WHEEZING OR SHORTNESS OF BREATH (Patient not taking: Reported on 11/27/2023)      carvedilol (COREG) 3.125 MG tablet Take 1 tablet by mouth with breakfast and with evening meal      famotidine (PEPCID) 20 MG tablet Take 1 tablet by mouth daily      furosemide (LASIX) 80 MG tablet TAKE 1 TABLET BY MOUTH EVERY MORNING AND 1 TABLET EVERY EVENING      apixaban (ELIQUIS) 2.5 MG TABS tablet Take 1 tablet by mouth 2 times daily      vitamin D (CHOLECALCIFEROL) 25 MCG (1000 UT) TABS tablet Take 1 tablet by mouth 2 times daily      simvastatin (ZOCOR) 20 MG tablet Take 1 tablet by mouth daily      ondansetron (ZOFRAN-ODT) 4 MG disintegrating tablet DISSOLVE 1 TABLET BY MOUTH EVERY 8 HOURS AS NEEDED FOR  NAUSEA AND VOMITING      loperamide (IMODIUM) 2 MG capsule 1 cap after each loose stool      calcitRIOL (ROCALTROL) 0.25 MCG capsule       dutasteride-tamsulosin 0.5-0.4 MG CAPS Take by mouth      ferrous sulfate (IRON 325) 325 (65 Fe) MG tablet Take 1 tablet by mouth daily      MAGNESIUM PO 2 tab PO in AM and 1 tab in PM      insulin glargine (LANTUS SOLOSTAR) 100 UNIT/ML injection pen Inject 16 Units into the skin every morning (Patient not taking: Reported on 11/27/2023)      finasteride (PROSCAR) 5 MG tablet Take 1 tablet by mouth daily      potassium chloride (KLOR-CON M) 20 MEQ extended release tablet Take 1 tablet by mouth 2 times daily      Biotin 1 MG CAPS Take 1 capsule by mouth daily (source and summary): Old medical records. Nursing notes. ED COURSE       Medial Decision Making:  DDX: TDC in place with possible superficial infection. Will consult pt's nephrologist as he is followed by [facility]. nephrology. Consult: Spoke to Dr. Kathy Garcia (on call [facility]. nephrology). He notes these are complications are typically handled by dialysis staff with cultures collected there and ABX started at center as indicated. He recommends keflex x 5 days. Labs reassuring. Discussed FU for dialysis on Tuesday as scheduled. Reasons to RTED discussed with pt. All questions answered. Pt feels comfortable going home at this time. Pt expressed understanding and he agrees with plan. FINAL IMPRESSION     1.  Other complication of vascular dialysis catheter, initial encounter [facility])            DISPOSITION/PLAN   DISPOSITION Decision To Discharge 12/09/2023 06:34:19 PM           PATIENT REFERRED TO:  Edwardo Dance, DO  [address] [place] [address] [address], [facility] [address]  [address] [place] [id number]          THE [facility] EMERGENCY DEPT  [address] [address]  [phone]             DISCHARGE MEDICATIONS:     Medication List        START taking these medications      cephALEXin 500 MG capsule  Commonly known as: Keflex  Take 1 capsule by mouth 4 times daily for 5 days            ASK your doctor about these medications      albuterol sulfate  (90 Base) MCG/ACT inhaler  Commonly known as: PROVENTIL;VENTOLIN;PROAIR     apixaban 2.5 MG Tabs tablet  Commonly known as: ELIQUIS     b complex vitamins capsule     Biotin 1 MG Caps     calcitRIOL 0.25 MCG capsule  Commonly known as: ROCALTROL     carvedilol 3.125 MG tablet  Commonly known as: COREG     CRANBERRY PO     dutasteride-tamsulosin 0.5-0.4 MG Caps     famotidine 20 MG tablet  Commonly known as: PEPCID     ferrous sulfate 325 (65 Fe) MG tablet  Commonly known as:

## 2023-12-09 NOTE — ED TRIAGE NOTES
79 y/o male to the ed with a c/c of requesting wound check per dialysis center. Patient has PICC line, and dialysis requesting having his PICC site cultured. Upon arrival, patient noted PW&D and presenting in NAD. Patient denies chest pain, sob or difficulty breathing. Patient denies ABD pain, n/v/d or fever. Patient noted with + pms x 4, pupils PERRLA @ 3 and lung sounds that are clear and equil bilaterally. Vitals noted as recorded.

## 2023-12-10 LAB
BACTERIA SPEC CULT: NORMAL
BACTERIA SPEC CULT: NORMAL
SERVICE CMNT-IMP: NORMAL
SERVICE CMNT-IMP: NORMAL

## 2023-12-12 ENCOUNTER — TELEPHONE (OUTPATIENT)
Age: 78
End: 2023-12-12

## 2023-12-15 RX ORDER — CHOLECALCIFEROL (VITAMIN D3) 25 MCG
CAPSULE ORAL
COMMUNITY
Start: 2020-11-11 | End: 2023-12-18

## 2023-12-15 RX ORDER — ACETAMINOPHEN 325 MG/1
325 TABLET ORAL EVERY 6 HOURS PRN
COMMUNITY
Start: 2021-01-18

## 2023-12-15 RX ORDER — LACTULOSE 10 G/15ML
SOLUTION ORAL
COMMUNITY
Start: 2023-11-11 | End: 2023-12-18

## 2023-12-15 RX ORDER — SIMVASTATIN 10 MG
10 TABLET ORAL NIGHTLY
COMMUNITY
Start: 2023-11-11 | End: 2023-12-18

## 2023-12-15 RX ORDER — CEPHALEXIN 250 MG/1
CAPSULE ORAL
COMMUNITY
Start: 2023-12-06 | End: 2023-12-18

## 2023-12-15 RX ORDER — ASCORBIC ACID 500 MG
500 TABLET ORAL DAILY
COMMUNITY

## 2023-12-15 RX ORDER — DOXYCYCLINE 100 MG/1
100 CAPSULE ORAL EVERY 12 HOURS
COMMUNITY
Start: 2023-12-08

## 2023-12-15 RX ORDER — FINASTERIDE 5 MG/1
5 TABLET, FILM COATED ORAL DAILY
COMMUNITY
Start: 2022-08-15 | End: 2023-12-15

## 2023-12-15 RX ORDER — AMLODIPINE BESYLATE 2.5 MG/1
2.5 TABLET ORAL DAILY
COMMUNITY

## 2023-12-15 RX ORDER — BIOTIN 2500 MCG
CAPSULE ORAL
COMMUNITY
Start: 2020-11-11 | End: 2023-12-15

## 2023-12-15 RX ORDER — INSULIN GLARGINE 100 [IU]/ML
45 INJECTION, SOLUTION SUBCUTANEOUS
COMMUNITY
Start: 2018-12-06

## 2023-12-15 RX ORDER — FUROSEMIDE 80 MG
80 TABLET ORAL DAILY
COMMUNITY
Start: 2018-03-23 | End: 2023-12-18

## 2023-12-15 RX ORDER — INSULIN LISPRO 100 [IU]/ML
INJECTION, SOLUTION INTRAVENOUS; SUBCUTANEOUS
COMMUNITY
Start: 2020-10-10 | End: 2023-12-15

## 2023-12-15 RX ORDER — SACCHAROMYCES BOULARDII 250 MG
1 CAPSULE ORAL DAILY
COMMUNITY
End: 2023-12-18

## 2023-12-15 RX ORDER — HYDROCORTISONE 25 MG/ML
1 LOTION TOPICAL 2 TIMES DAILY PRN
COMMUNITY
Start: 2021-10-07

## 2023-12-15 RX ORDER — INSULIN GLARGINE 100 [IU]/ML
16 INJECTION, SOLUTION SUBCUTANEOUS NIGHTLY
COMMUNITY
Start: 2018-03-23

## 2023-12-15 RX ORDER — KETOROLAC TROMETHAMINE 4 MG/ML
1 SOLUTION/ DROPS OPHTHALMIC 3 TIMES DAILY
COMMUNITY
Start: 2018-10-08 | End: 2023-12-18

## 2023-12-15 RX ORDER — TRIAMCINOLONE ACETONIDE 1 MG/G
OINTMENT TOPICAL 2 TIMES DAILY
COMMUNITY
End: 2023-12-18

## 2023-12-15 RX ORDER — CINNAMON BARK
1200 POWDER (GRAM) MISCELLANEOUS DAILY
COMMUNITY

## 2023-12-18 ENCOUNTER — OFFICE VISIT (OUTPATIENT)
Age: 78
End: 2023-12-18
Payer: MEDICARE

## 2023-12-18 VITALS
BODY MASS INDEX: 29.26 KG/M2 | OXYGEN SATURATION: 97 % | TEMPERATURE: 97.3 F | WEIGHT: 209 LBS | HEIGHT: 71 IN | HEART RATE: 65 BPM | SYSTOLIC BLOOD PRESSURE: 132 MMHG | DIASTOLIC BLOOD PRESSURE: 71 MMHG

## 2023-12-18 DIAGNOSIS — K70.30 ALCOHOLIC CIRRHOSIS OF LIVER WITHOUT ASCITES (HCC): Primary | ICD-10-CM

## 2023-12-18 PROBLEM — K74.60 CIRRHOSIS (HCC): Status: ACTIVE | Noted: 2023-12-18

## 2023-12-18 PROCEDURE — G8427 DOCREV CUR MEDS BY ELIG CLIN: HCPCS | Performed by: INTERNAL MEDICINE

## 2023-12-18 PROCEDURE — 3078F DIAST BP <80 MM HG: CPT | Performed by: INTERNAL MEDICINE

## 2023-12-18 PROCEDURE — G8419 CALC BMI OUT NRM PARAM NOF/U: HCPCS | Performed by: INTERNAL MEDICINE

## 2023-12-18 PROCEDURE — 1036F TOBACCO NON-USER: CPT | Performed by: INTERNAL MEDICINE

## 2023-12-18 PROCEDURE — 99204 OFFICE O/P NEW MOD 45 MIN: CPT | Performed by: INTERNAL MEDICINE

## 2023-12-18 PROCEDURE — G8484 FLU IMMUNIZE NO ADMIN: HCPCS | Performed by: INTERNAL MEDICINE

## 2023-12-18 PROCEDURE — 1123F ACP DISCUSS/DSCN MKR DOCD: CPT | Performed by: INTERNAL MEDICINE

## 2023-12-18 PROCEDURE — 3075F SYST BP GE 130 - 139MM HG: CPT | Performed by: INTERNAL MEDICINE

## 2023-12-18 NOTE — PROGRESS NOTES
MD Isaiah, [address] [facility], [place], [place]      MASSIEL Mariscal S Emma, [facility]-BC   Hosea Burton, [facility]-   Fatmata Tabares, [facility]-C  [facility]   Cailin Mccabe, [facility]NP-BC   Nahumso Wheatley, FNP-BC      [address] [facility]   at [facility]   [address] [facility], [address] [facility], [address]   [phone]   FAX: [phone]  Liver Franklin of [facility]   at [facility], [address] [facility], [address]   [phone]   FAX: [phone]         Patient Care Team:  NATY Cid - NP as PCP - General (Nurse Practitioner)  Naomy Kemp MD (General Surgery)  Rah Gannon DO (Nephrology)      Patient Active Problem List   Diagnosis    Essential hypertension    Type 2 diabetes mellitus ([address])    Hyperlipidemia    Pauci-immune RPGN (rapidly progressive glomerulonephritis)    Anemia associated with chronic renal failure    Cirrhosis ([address])    Ascites       The clinicians listed above have asked me to see Zhanna Brower in consultation regarding management of   cirrhosis that is presumed secondary to Alcohol. All medical records sent by the referring physicians were reviewed including imaging studies     The is a 78 y.o. male who was found to have cirrhosis in 2021 when he underwent a CT scan of the abdomen. Serologic evaluation for markers of chronic liver disease was negative. He developed progressive worsening in CKD, developed swelling of the lower extremities, confusion, poor balance and was hospitalized in 2023.       He was found to have elevated ammonia which resolved when he was started on dialysis    The patient has developed the following complications of cirrhosis: edema, hepatic encephalopathy,     In the office today the patient has the following symptoms:  fatigue,     The patient

## 2023-12-27 ENCOUNTER — HOSPITAL ENCOUNTER (OUTPATIENT)
Facility: HOSPITAL | Age: 78
Setting detail: INFUSION SERIES
Discharge: HOME | End: 2023-12-27
Payer: MEDICARE

## 2023-12-27 VITALS
HEART RATE: 65 BPM | OXYGEN SATURATION: 98 % | TEMPERATURE: 97.3 F | DIASTOLIC BLOOD PRESSURE: 62 MMHG | SYSTOLIC BLOOD PRESSURE: 140 MMHG | RESPIRATION RATE: 16 BRPM

## 2023-12-27 DIAGNOSIS — N18.9 ANEMIA ASSOCIATED WITH CHRONIC RENAL FAILURE: Primary | ICD-10-CM

## 2023-12-27 DIAGNOSIS — D63.1 ANEMIA ASSOCIATED WITH CHRONIC RENAL FAILURE: Primary | ICD-10-CM

## 2023-12-27 LAB
BASO+EOS+MONOS # BLD AUTO: 0.7 K/UL (ref 0–2.3)
BASO+EOS+MONOS NFR BLD AUTO: 13 % (ref 0.1–17)
DIFFERENTIAL METHOD BLD: ABNORMAL
ERYTHROCYTE [DISTWIDTH] IN BLOOD BY AUTOMATED COUNT: 15.2 % (ref 11.5–14.5)
HCT VFR BLD AUTO: 31.8 % (ref 36–48)
HGB BLD-MCNC: 10.1 G/DL (ref 12–16)
LYMPHOCYTES # BLD: 1.8 K/UL (ref 1.1–5.9)
LYMPHOCYTES NFR BLD: 34 % (ref 14–44)
MCH RBC QN AUTO: 31.3 PG (ref 25–35)
MCHC RBC AUTO-ENTMCNC: 31.8 G/DL (ref 31–37)
MCV RBC AUTO: 98.5 FL (ref 78–102)
NEUTS SEG # BLD: 2.8 K/UL (ref 1.8–9.5)
NEUTS SEG NFR BLD: 53 % (ref 40–70)
PLATELET # BLD AUTO: 118 K/UL (ref 140–440)
RBC # BLD AUTO: 3.23 M/UL (ref 4.1–5.1)
WBC # BLD AUTO: 5.3 K/UL (ref 4.5–13)

## 2023-12-27 PROCEDURE — 85025 COMPLETE CBC W/AUTO DIFF WBC: CPT

## 2023-12-27 PROCEDURE — 36415 COLL VENOUS BLD VENIPUNCTURE: CPT

## 2023-12-27 PROCEDURE — 96372 THER/PROPH/DIAG INJ SC/IM: CPT

## 2023-12-27 PROCEDURE — 6360000002 HC RX W HCPCS: Performed by: INTERNAL MEDICINE

## 2023-12-27 RX ORDER — DIPHENHYDRAMINE HYDROCHLORIDE 50 MG/ML
50 INJECTION INTRAMUSCULAR; INTRAVENOUS
OUTPATIENT
Start: 2024-01-17

## 2023-12-27 RX ORDER — ACETAMINOPHEN 325 MG/1
650 TABLET ORAL
OUTPATIENT
Start: 2024-01-17

## 2023-12-27 RX ORDER — ALBUTEROL SULFATE 90 UG/1
4 AEROSOL, METERED RESPIRATORY (INHALATION) PRN
OUTPATIENT
Start: 2024-01-17

## 2023-12-27 RX ORDER — ONDANSETRON 2 MG/ML
8 INJECTION INTRAMUSCULAR; INTRAVENOUS
OUTPATIENT
Start: 2024-01-17

## 2023-12-27 RX ORDER — SODIUM CHLORIDE 9 MG/ML
INJECTION, SOLUTION INTRAVENOUS CONTINUOUS
OUTPATIENT
Start: 2024-01-17

## 2023-12-27 RX ORDER — EPINEPHRINE 1 MG/ML
0.3 INJECTION, SOLUTION, CONCENTRATE INTRAVENOUS PRN
OUTPATIENT
Start: 2024-01-17

## 2023-12-27 RX ADMIN — EPOETIN ALFA-EPBX 40000 UNITS: 10000 INJECTION, SOLUTION INTRAVENOUS; SUBCUTANEOUS at 12:47

## 2023-12-31 PROBLEM — R73.9 HYPERGLYCEMIA: Status: RESOLVED | Noted: 2018-12-05 | Resolved: 2023-12-31

## 2023-12-31 PROBLEM — R18.8 ASCITES: Status: ACTIVE | Noted: 2023-12-31

## 2023-12-31 PROBLEM — E11.9 TYPE 2 DIABETES MELLITUS (HCC): Status: ACTIVE | Noted: 2018-12-05

## 2024-01-22 ENCOUNTER — HOSPITAL ENCOUNTER (EMERGENCY)
Facility: HOSPITAL | Age: 79
Discharge: HOME | End: 2024-01-22
Attending: STUDENT IN AN ORGANIZED HEALTH CARE EDUCATION/TRAINING PROGRAM
Payer: MEDICARE

## 2024-01-22 ENCOUNTER — APPOINTMENT (OUTPATIENT)
Facility: HOSPITAL | Age: 79
End: 2024-01-22
Payer: MEDICARE

## 2024-01-22 VITALS
SYSTOLIC BLOOD PRESSURE: 196 MMHG | WEIGHT: 220 LBS | DIASTOLIC BLOOD PRESSURE: 85 MMHG | OXYGEN SATURATION: 95 % | TEMPERATURE: 98.1 F | BODY MASS INDEX: 30.8 KG/M2 | RESPIRATION RATE: 18 BRPM | HEART RATE: 70 BPM | HEIGHT: 71 IN

## 2024-01-22 DIAGNOSIS — R10.84 GENERALIZED ABDOMINAL PAIN: Primary | ICD-10-CM

## 2024-01-22 DIAGNOSIS — N18.6 ESRD (END STAGE RENAL DISEASE) (HCC): ICD-10-CM

## 2024-01-22 DIAGNOSIS — R91.8 LUNG MASS: ICD-10-CM

## 2024-01-22 LAB
ALBUMIN SERPL-MCNC: 3.3 G/DL (ref 3.4–5)
ALBUMIN/GLOB SERPL: 1.1 (ref 0.8–1.7)
ALP SERPL-CCNC: 150 U/L (ref 45–117)
ALT SERPL-CCNC: 38 U/L (ref 16–61)
ANION GAP SERPL CALC-SCNC: 2 MMOL/L (ref 3–18)
APPEARANCE UR: CLEAR
AST SERPL-CCNC: 37 U/L (ref 10–38)
BACTERIA URNS QL MICRO: ABNORMAL /HPF
BASOPHILS # BLD: 0 K/UL (ref 0–0.1)
BASOPHILS NFR BLD: 1 % (ref 0–2)
BILIRUB SERPL-MCNC: 0.7 MG/DL (ref 0.2–1)
BILIRUB UR QL: NEGATIVE
BUN SERPL-MCNC: 56 MG/DL (ref 7–18)
BUN/CREAT SERPL: 15 (ref 12–20)
CALCIUM SERPL-MCNC: 9.2 MG/DL (ref 8.5–10.1)
CHLORIDE SERPL-SCNC: 104 MMOL/L (ref 100–111)
CO2 SERPL-SCNC: 29 MMOL/L (ref 21–32)
COLOR UR: YELLOW
CREAT SERPL-MCNC: 3.73 MG/DL (ref 0.6–1.3)
DIFFERENTIAL METHOD BLD: ABNORMAL
EOSINOPHIL # BLD: 0.2 K/UL (ref 0–0.4)
EOSINOPHIL NFR BLD: 5 % (ref 0–5)
ERYTHROCYTE [DISTWIDTH] IN BLOOD BY AUTOMATED COUNT: 14 % (ref 11.6–14.5)
GLOBULIN SER CALC-MCNC: 3 G/DL (ref 2–4)
GLUCOSE SERPL-MCNC: 274 MG/DL (ref 74–99)
GLUCOSE UR STRIP.AUTO-MCNC: 500 MG/DL
HCT VFR BLD AUTO: 31.8 % (ref 36–48)
HGB BLD-MCNC: 11 G/DL (ref 13–16)
HGB UR QL STRIP: NEGATIVE
IMM GRANULOCYTES # BLD AUTO: 0 K/UL (ref 0–0.04)
IMM GRANULOCYTES NFR BLD AUTO: 0 % (ref 0–0.5)
KETONES UR QL STRIP.AUTO: NEGATIVE MG/DL
LEUKOCYTE ESTERASE UR QL STRIP.AUTO: NEGATIVE
LIPASE SERPL-CCNC: 26 U/L (ref 13–75)
LYMPHOCYTES # BLD: 1 K/UL (ref 0.9–3.6)
LYMPHOCYTES NFR BLD: 26 % (ref 21–52)
MCH RBC QN AUTO: 33.5 PG (ref 24–34)
MCHC RBC AUTO-ENTMCNC: 34.6 G/DL (ref 31–37)
MCV RBC AUTO: 97 FL (ref 78–100)
MONOCYTES # BLD: 0.4 K/UL (ref 0.05–1.2)
MONOCYTES NFR BLD: 10 % (ref 3–10)
NEUTS SEG # BLD: 2.2 K/UL (ref 1.8–8)
NEUTS SEG NFR BLD: 58 % (ref 40–73)
NITRITE UR QL STRIP.AUTO: NEGATIVE
NRBC # BLD: 0 K/UL (ref 0–0.01)
NRBC BLD-RTO: 0 PER 100 WBC
PH UR STRIP: 8 (ref 5–8)
PLATELET # BLD AUTO: 123 K/UL (ref 135–420)
PMV BLD AUTO: 10.5 FL (ref 9.2–11.8)
POTASSIUM SERPL-SCNC: 4.3 MMOL/L (ref 3.5–5.5)
PROT SERPL-MCNC: 6.3 G/DL (ref 6.4–8.2)
PROT UR STRIP-MCNC: 300 MG/DL
RBC # BLD AUTO: 3.28 M/UL (ref 4.35–5.65)
RBC #/AREA URNS HPF: ABNORMAL /HPF (ref 0–5)
SODIUM SERPL-SCNC: 135 MMOL/L (ref 136–145)
SP GR UR REFRACTOMETRY: 1.02 (ref 1–1.03)
UROBILINOGEN UR QL STRIP.AUTO: 0.2 EU/DL (ref 0.2–1)
WBC # BLD AUTO: 3.8 K/UL (ref 4.6–13.2)
WBC URNS QL MICRO: ABNORMAL /HPF (ref 0–5)

## 2024-01-22 PROCEDURE — 85025 COMPLETE CBC W/AUTO DIFF WBC: CPT

## 2024-01-22 PROCEDURE — 74176 CT ABD & PELVIS W/O CONTRAST: CPT

## 2024-01-22 PROCEDURE — 71250 CT THORAX DX C-: CPT

## 2024-01-22 PROCEDURE — 83690 ASSAY OF LIPASE: CPT

## 2024-01-22 PROCEDURE — 99284 EMERGENCY DEPT VISIT MOD MDM: CPT

## 2024-01-22 PROCEDURE — 81001 URINALYSIS AUTO W/SCOPE: CPT

## 2024-01-22 PROCEDURE — 80053 COMPREHEN METABOLIC PANEL: CPT

## 2024-01-22 NOTE — ED TRIAGE NOTES
Pt arrives to ed reporting abd pain that began during peritoneal dialysis. Dialysis was able to drain 1L off.

## 2024-01-22 NOTE — ED PROVIDER NOTES
EMERGENCY DEPARTMENT HISTORY AND PHYSICAL EXAM    3:03 PM      Date: 1/22/2024  Patient Name: Marques Ventura    History of Presenting Illness     Chief Complaint   Patient presents with    Abdominal Pain       History From: Patient  HPI  78-year-old male with history of ESRD with PD daily with PD insertion on 1/17/2024, hyperlipidemia, type 2 diabetes, CAD, CHF, TAVR, A-fib, pseudoaneurysm, liver cirrhosis who presents with abdominal pain.  Patient says that he was receiving dialysis today and developed acute onset abdominal pain today.  Located in right lower quadrant, nonradiating, intermittent.  Mild alleviation of symptoms after patient had dialysis performed today.  When assessing ROS he denies any nausea, vomiting, chest pain, shortness of breath, or any other changes.     Nursing Notes were all reviewed and agreed with or any disagreements were addressed in the HPI.    PCP: Reema Azar, NATY - NP    No current facility-administered medications for this encounter.     Current Outpatient Medications   Medication Sig Dispense Refill    Cinnamon Bark POWD Take 1,200 mg by mouth daily      insulin glargine (LANTUS) 100 UNIT/ML injection vial Inject 45 Units into the skin every morning (before breakfast)      insulin glargine (LANTUS) 100 UNIT/ML injection vial Inject 16 Units into the skin nightly      Insulin Pen Needle 32G X 5 MM MISC Inject 1 each into the skin      acetaminophen (TYLENOL) 325 MG tablet Take 1 tablet by mouth every 6 hours as needed      amLODIPine (NORVASC) 2.5 MG tablet Take 1 tablet by mouth daily      ascorbic acid (VITAMIN C) 500 MG tablet Take 1 tablet by mouth daily (Patient not taking: Reported on 12/18/2023)      doxycycline monohydrate (MONODOX) 100 MG capsule Take 1 capsule by mouth in the morning and 1 capsule in the evening.      hydrocortisone (HYTONE) 2.5 % lotion Apply 1 Application topically 2 times daily as needed      Vitamins-Lipotropics (B COMPLEX, LIPOTROPICS,) TABS

## 2024-01-22 NOTE — ED NOTES
Called into room by family. Family upset because \"they have not seen anyone\". Explained labs and test results that were resulted to family and pending chest ct. Pt sts he wants something to eat because he is supposed to eat every 4 hours. Explained to pt that he was npo and can be re evaluated after the ct. Primary nurse aware.

## 2024-01-22 NOTE — ED PROVIDER NOTES
Patient was signed out to me by the previous physician. Please refer to the previous physician's dictation for more complete history. In summary the patient presents with abdominal pain.  He has a history of ESRD on peritoneal dialysis.  Nephrology was consulted and recommended CT without contrast of the abdomen pelvis.    The patient is awaiting CT of the abdomen pelvis without contrast.    On reevaluation the patient appeared asymptomatic. CT of the abdomen and pelvis showed a 2 cm mass in the right lower lung lobe. CT chest was recommended. CT chest without contrast re demonstrated the mass but showed no other acute pathology.    DISPOSITION Decision To Discharge 01/22/2024 07:11:40 PM    DISCHARGE NOTE:  Marques Ventura's  results have been reviewed with him.  He has been counseled regarding his diagnosis, treatment, and plan.  He verbally conveys understanding and agreement of the signs, symptoms, diagnosis, treatment and prognosis and additionally agrees to follow up as discussed.  He also agrees with the care-plan and conveys that all of his questions have been answered.  I have also provided discharge instructions for him that include: educational information regarding their diagnosis and treatment, and list of reasons why they would want to return to the ED prior to their follow-up appointment, should his condition change. He has been provided with education for proper emergency department utilization.     CLINICAL IMPRESSION:  1. Generalized abdominal pain    2. Lung mass    3. ESRD (end stage renal disease) ([facility])        PLAN:  D/C Home    DISCHARGE MEDICATIONS:  Discharge Medication List as of 1/22/2024  7:19 PM             Details   Cinnamon Bark POWD Take 1,200 mg by mouth dailyHistorical Med      !! insulin glargine (LANTUS) 100 UNIT/ML injection vial Inject 45 Units into the skin every morning (before breakfast)Historical Med      !! insulin glargine (LANTUS) 100 UNIT/ML injection vial Inject 16 Units

## 2024-01-23 ASSESSMENT — ENCOUNTER SYMPTOMS
ABDOMINAL DISTENTION: 0
DIARRHEA: 0
BLOOD IN STOOL: 0
ABDOMINAL PAIN: 1
FACIAL SWELLING: 0
BACK PAIN: 0
EYE PAIN: 0
SHORTNESS OF BREATH: 0
CHEST TIGHTNESS: 0
CONSTIPATION: 0

## 2024-01-24 ENCOUNTER — HOSPITAL ENCOUNTER (OUTPATIENT)
Facility: HOSPITAL | Age: 79
Setting detail: INFUSION SERIES
End: 2024-01-24